# Patient Record
Sex: MALE | Race: WHITE | URBAN - METROPOLITAN AREA
[De-identification: names, ages, dates, MRNs, and addresses within clinical notes are randomized per-mention and may not be internally consistent; named-entity substitution may affect disease eponyms.]

---

## 2019-04-02 ENCOUNTER — APPOINTMENT (RX ONLY)
Dept: URBAN - METROPOLITAN AREA CLINIC 148 | Facility: CLINIC | Age: 59
Setting detail: DERMATOLOGY
End: 2019-04-02

## 2019-04-02 DIAGNOSIS — L81.4 OTHER MELANIN HYPERPIGMENTATION: ICD-10-CM

## 2019-04-02 DIAGNOSIS — I87.2 VENOUS INSUFFICIENCY (CHRONIC) (PERIPHERAL): ICD-10-CM

## 2019-04-02 DIAGNOSIS — L57.0 ACTINIC KERATOSIS: ICD-10-CM

## 2019-04-02 DIAGNOSIS — L82.0 INFLAMED SEBORRHEIC KERATOSIS: ICD-10-CM

## 2019-04-02 DIAGNOSIS — D22 MELANOCYTIC NEVI: ICD-10-CM

## 2019-04-02 DIAGNOSIS — L08.0 PYODERMA: ICD-10-CM

## 2019-04-02 DIAGNOSIS — D18.0 HEMANGIOMA: ICD-10-CM

## 2019-04-02 DIAGNOSIS — L82.1 OTHER SEBORRHEIC KERATOSIS: ICD-10-CM

## 2019-04-02 PROBLEM — D18.01 HEMANGIOMA OF SKIN AND SUBCUTANEOUS TISSUE: Status: ACTIVE | Noted: 2019-04-02

## 2019-04-02 PROBLEM — D22.5 MELANOCYTIC NEVI OF TRUNK: Status: ACTIVE | Noted: 2019-04-02

## 2019-04-02 PROBLEM — I10 ESSENTIAL (PRIMARY) HYPERTENSION: Status: ACTIVE | Noted: 2019-04-02

## 2019-04-02 PROBLEM — D48.5 NEOPLASM OF UNCERTAIN BEHAVIOR OF SKIN: Status: ACTIVE | Noted: 2019-04-02

## 2019-04-02 PROCEDURE — ? LIQUID NITROGEN

## 2019-04-02 PROCEDURE — ? PRESCRIPTION MEDICATION MANAGEMENT

## 2019-04-02 PROCEDURE — 17004 DESTROY PREMAL LESIONS 15/>: CPT

## 2019-04-02 PROCEDURE — 99203 OFFICE O/P NEW LOW 30 MIN: CPT | Mod: 25

## 2019-04-02 PROCEDURE — ? PRESCRIPTION

## 2019-04-02 PROCEDURE — ? COUNSELING

## 2019-04-02 PROCEDURE — ? ADDITIONAL NOTES

## 2019-04-02 PROCEDURE — 17110 DESTRUCTION B9 LES UP TO 14: CPT | Mod: 59

## 2019-04-02 PROCEDURE — 11102 TANGNTL BX SKIN SINGLE LES: CPT | Mod: 59

## 2019-04-02 PROCEDURE — ? BIOPSY BY SHAVE METHOD

## 2019-04-02 PROCEDURE — ? INVENTORY

## 2019-04-02 PROCEDURE — ? ORDER TESTS

## 2019-04-02 RX ORDER — GENTAMICIN SULFATE 1 MG/G
OINTMENT TOPICAL
Qty: 1 | Refills: 0 | Status: ERX

## 2019-04-02 ASSESSMENT — LOCATION SIMPLE DESCRIPTION DERM
LOCATION SIMPLE: RIGHT FOREARM
LOCATION SIMPLE: LEFT PRETIBIAL REGION
LOCATION SIMPLE: LEFT UPPER BACK
LOCATION SIMPLE: POSTERIOR SCALP
LOCATION SIMPLE: RIGHT UPPER BACK
LOCATION SIMPLE: ABDOMEN
LOCATION SIMPLE: GROIN
LOCATION SIMPLE: RIGHT PRETIBIAL REGION
LOCATION SIMPLE: LEFT FOREARM

## 2019-04-02 ASSESSMENT — LOCATION ZONE DERM
LOCATION ZONE: TRUNK
LOCATION ZONE: LEG
LOCATION ZONE: ARM
LOCATION ZONE: SCALP

## 2019-04-02 ASSESSMENT — LOCATION DETAILED DESCRIPTION DERM
LOCATION DETAILED: RIGHT DISTAL RADIAL DORSAL FOREARM
LOCATION DETAILED: LEFT PROXIMAL RADIAL DORSAL FOREARM
LOCATION DETAILED: LEFT MEDIAL UPPER BACK
LOCATION DETAILED: RIGHT PROXIMAL DORSAL FOREARM
LOCATION DETAILED: LEFT DISTAL DORSAL FOREARM
LOCATION DETAILED: LEFT INFERIOR MEDIAL UPPER BACK
LOCATION DETAILED: RIGHT DISTAL PRETIBIAL REGION
LOCATION DETAILED: LEFT SUPERIOR UPPER BACK
LOCATION DETAILED: LEFT OCCIPITAL SCALP
LOCATION DETAILED: LEFT PROXIMAL DORSAL FOREARM
LOCATION DETAILED: PERIUMBILICAL SKIN
LOCATION DETAILED: RIGHT DISTAL ULNAR DORSAL FOREARM
LOCATION DETAILED: RIGHT DISTAL DORSAL FOREARM
LOCATION DETAILED: LEFT DISTAL RADIAL DORSAL FOREARM
LOCATION DETAILED: SUPRAPUBIC SKIN
LOCATION DETAILED: LEFT PROXIMAL PRETIBIAL REGION
LOCATION DETAILED: RIGHT SUPERIOR UPPER BACK

## 2019-04-02 NOTE — PROCEDURE: BIOPSY BY SHAVE METHOD
Biopsy Type: H and E
Billing Type: Third-Party Bill
Type Of Destruction Used: Curettage
Anesthesia Volume In Cc (Will Not Render If 0): 0.5
Electrodesiccation And Curettage Text: The wound bed was treated with electrodesiccation and curettage after the biopsy was performed.
Post-Care Instructions: I reviewed with the patient in detail post-care instructions. Patient is to keep the biopsy site dry overnight, and then apply bacitracin twice daily until healed. Patient may apply hydrogen peroxide soaks to remove any crusting.
Dressing: bandage
Hemostasis: Aluminum Chloride
Silver Nitrate Text: The wound bed was treated with silver nitrate after the biopsy was performed.
Was A Bandage Applied: Yes
Notification Instructions: Patient will be notified of biopsy results. However, patient instructed to call the office if not contacted within 2 weeks.
Biopsy Method: double edge Personna blade
Bill For Surgical Tray: no
Curettage Text: The wound bed was treated with curettage after the biopsy was performed.
Lab: 6
Detail Level: Detailed
Consent: Written consent was obtained and risks were reviewed including but not limited to scarring, infection, bleeding, scabbing, incomplete removal, nerve damage and allergy to anesthesia.
Anesthesia Type: 1% lidocaine with epinephrine
Cryotherapy Text: The wound bed was treated with cryotherapy after the biopsy was performed.
Additional Anesthesia Volume In Cc (Will Not Render If 0): 0
Lab Facility: [id number]
Wound Care: Petrolatum
Electrodesiccation Text: The wound bed was treated with electrodesiccation after the biopsy was performed.
Depth Of Biopsy: dermis

## 2019-04-02 NOTE — PROCEDURE: LIQUID NITROGEN
Post-Care Instructions: I reviewed with the patient in detail post-care instructions. Patient is to wear sunprotection, and avoid picking at any of the treated lesions. Pt may apply Vaseline to crusted or scabbing areas.
Duration Of Freeze Thaw-Cycle (Seconds): 3
Render Post-Care Instructions In Note?: no
Consent: The patient's consent was obtained including but not limited to risks of crusting, scabbing, blistering, scarring, darker or lighter pigmentary change, recurrence, incomplete removal and infection.
Detail Level: Zone
Medical Necessity Clause: This procedure was medically necessary because the lesions that were treated were:
Medical Necessity Information: It is in your best interest to select a reason for this procedure from the list below. All of these items fulfill various CMS LCD requirements except the new and changing color options.
Detail Level: Detailed

## 2019-04-23 ENCOUNTER — APPOINTMENT (RX ONLY)
Dept: URBAN - METROPOLITAN AREA CLINIC 148 | Facility: CLINIC | Age: 59
Setting detail: DERMATOLOGY
End: 2019-04-23

## 2019-04-23 DIAGNOSIS — L81.4 OTHER MELANIN HYPERPIGMENTATION: ICD-10-CM

## 2019-04-23 DIAGNOSIS — L57.0 ACTINIC KERATOSIS: ICD-10-CM

## 2019-04-23 DIAGNOSIS — L82.0 INFLAMED SEBORRHEIC KERATOSIS: ICD-10-CM | Status: IMPROVED

## 2019-04-23 DIAGNOSIS — L91.8 OTHER HYPERTROPHIC DISORDERS OF THE SKIN: ICD-10-CM

## 2019-04-23 DIAGNOSIS — B07.8 OTHER VIRAL WARTS: ICD-10-CM

## 2019-04-23 PROCEDURE — 99213 OFFICE O/P EST LOW 20 MIN: CPT | Mod: 25

## 2019-04-23 PROCEDURE — ? LIQUID NITROGEN

## 2019-04-23 PROCEDURE — ? PATHOLOGY DISCUSSION

## 2019-04-23 PROCEDURE — 17003 DESTRUCT PREMALG LES 2-14: CPT | Mod: 59

## 2019-04-23 PROCEDURE — ? COUNSELING

## 2019-04-23 PROCEDURE — ? BENIGN DESTRUCTION COSMETIC

## 2019-04-23 PROCEDURE — 17000 DESTRUCT PREMALG LESION: CPT | Mod: 59

## 2019-04-23 PROCEDURE — 17110 DESTRUCTION B9 LES UP TO 14: CPT

## 2019-04-23 ASSESSMENT — LOCATION ZONE DERM
LOCATION ZONE: FACE
LOCATION ZONE: LEG
LOCATION ZONE: TRUNK
LOCATION ZONE: NECK
LOCATION ZONE: ARM

## 2019-04-23 ASSESSMENT — LOCATION DETAILED DESCRIPTION DERM
LOCATION DETAILED: LEFT CLAVICULAR NECK
LOCATION DETAILED: RIGHT ANTERIOR DISTAL UPPER ARM
LOCATION DETAILED: RIGHT DISTAL RADIAL DORSAL FOREARM
LOCATION DETAILED: LEFT PROXIMAL PRETIBIAL REGION
LOCATION DETAILED: RIGHT INFERIOR LATERAL NECK
LOCATION DETAILED: RIGHT PROXIMAL DORSAL FOREARM
LOCATION DETAILED: RIGHT CLAVICULAR NECK
LOCATION DETAILED: LEFT PROXIMAL DORSAL FOREARM
LOCATION DETAILED: LEFT MEDIAL SUPERIOR CHEST
LOCATION DETAILED: LEFT SUPERIOR MEDIAL BUCCAL CHEEK
LOCATION DETAILED: LEFT DISTAL DORSAL FOREARM

## 2019-04-23 ASSESSMENT — LOCATION SIMPLE DESCRIPTION DERM
LOCATION SIMPLE: LEFT FOREARM
LOCATION SIMPLE: RIGHT FOREARM
LOCATION SIMPLE: RIGHT UPPER ARM
LOCATION SIMPLE: LEFT ANTERIOR NECK
LOCATION SIMPLE: CHEST
LOCATION SIMPLE: LEFT PRETIBIAL REGION
LOCATION SIMPLE: LEFT CHEEK
LOCATION SIMPLE: RIGHT ANTERIOR NECK

## 2019-04-23 NOTE — PROCEDURE: LIQUID NITROGEN
Detail Level: Detailed
Consent: The patient's consent was obtained including but not limited to risks of crusting, scabbing, blistering, scarring, darker or lighter pigmentary change, recurrence, incomplete removal and infection.
Medical Necessity Information: It is in your best interest to select a reason for this procedure from the list below. All of these items fulfill various CMS LCD requirements except the new and changing color options.
Post-Care Instructions: I reviewed with the patient in detail post-care instructions. Patient is to wear sunprotection, and avoid picking at any of the treated lesions. Pt may apply Vaseline to crusted or scabbing areas.
Render Post-Care Instructions In Note?: no
Medical Necessity Clause: This procedure was medically necessary because the lesions that were treated were:
Duration Of Freeze Thaw-Cycle (Seconds): 3
Detail Level: Zone

## 2019-04-23 NOTE — PROCEDURE: BENIGN DESTRUCTION COSMETIC
Consent: The patient's consent was obtained including but not limited to risks of crusting, scabbing, blistering, scarring, darker or lighter pigmentary change, recurrence, incomplete removal and infection.
Post-Care Instructions: I reviewed with the patient in detail post-care instructions. Patient is to wear sunprotection, and avoid picking at any of the treated lesions. Pt may apply Vaseline to crusted or scabbing areas.
Price (Use Numbers Only, No Special Characters Or $): 150
Detail Level: Detailed
Anesthesia Volume In Cc: 0.5

## 2020-08-07 ENCOUNTER — OFFICE VISIT (OUTPATIENT)
Dept: URBAN - METROPOLITAN AREA CLINIC 7 | Facility: CLINIC | Age: 60
End: 2020-08-07

## 2020-08-20 ENCOUNTER — TELEPHONE ENCOUNTER (OUTPATIENT)
Dept: URBAN - METROPOLITAN AREA CLINIC 9 | Facility: CLINIC | Age: 60
End: 2020-08-20

## 2020-08-21 ENCOUNTER — OFFICE VISIT (OUTPATIENT)
Dept: URBAN - METROPOLITAN AREA SURGERY CENTER 5 | Facility: SURGERY CENTER | Age: 60
End: 2020-08-21

## 2020-08-24 ENCOUNTER — TELEPHONE ENCOUNTER (OUTPATIENT)
Dept: URBAN - METROPOLITAN AREA CLINIC 9 | Facility: CLINIC | Age: 60
End: 2020-08-24

## 2020-08-27 ENCOUNTER — OFFICE VISIT (OUTPATIENT)
Dept: URBAN - METROPOLITAN AREA SURGERY CENTER 5 | Facility: SURGERY CENTER | Age: 60
End: 2020-08-27

## 2020-09-01 ENCOUNTER — TELEPHONE ENCOUNTER (OUTPATIENT)
Dept: URBAN - METROPOLITAN AREA CLINIC 9 | Facility: CLINIC | Age: 60
End: 2020-09-01

## 2021-03-31 ENCOUNTER — OFFICE VISIT (OUTPATIENT)
Dept: URBAN - METROPOLITAN AREA CLINIC 7 | Facility: CLINIC | Age: 61
End: 2021-03-31

## 2021-03-31 ENCOUNTER — TELEPHONE ENCOUNTER (OUTPATIENT)
Dept: URBAN - METROPOLITAN AREA CLINIC 9 | Facility: CLINIC | Age: 61
End: 2021-03-31

## 2021-04-01 ENCOUNTER — OFFICE VISIT (OUTPATIENT)
Age: 61
End: 2021-04-01

## 2021-04-02 ENCOUNTER — TELEPHONE ENCOUNTER (OUTPATIENT)
Dept: URBAN - METROPOLITAN AREA CLINIC 9 | Facility: CLINIC | Age: 61
End: 2021-04-02

## 2021-04-16 ENCOUNTER — OFFICE VISIT (OUTPATIENT)
Dept: URBAN - METROPOLITAN AREA SURGERY CENTER 5 | Facility: SURGERY CENTER | Age: 61
End: 2021-04-16

## 2021-06-03 ENCOUNTER — LAB OUTSIDE AN ENCOUNTER (OUTPATIENT)
Age: 61
End: 2021-06-03

## 2021-06-07 ENCOUNTER — TELEPHONE ENCOUNTER (OUTPATIENT)
Dept: URBAN - METROPOLITAN AREA CLINIC 9 | Facility: CLINIC | Age: 61
End: 2021-06-07

## 2021-06-21 ENCOUNTER — TELEPHONE ENCOUNTER (OUTPATIENT)
Dept: URBAN - METROPOLITAN AREA CLINIC 9 | Facility: CLINIC | Age: 61
End: 2021-06-21

## 2021-06-29 ENCOUNTER — OFFICE VISIT (OUTPATIENT)
Dept: URBAN - METROPOLITAN AREA CLINIC 7 | Facility: CLINIC | Age: 61
End: 2021-06-29

## 2021-07-09 ENCOUNTER — TELEPHONE ENCOUNTER (OUTPATIENT)
Dept: URBAN - METROPOLITAN AREA CLINIC 9 | Facility: CLINIC | Age: 61
End: 2021-07-09

## 2021-07-12 ENCOUNTER — TELEPHONE ENCOUNTER (OUTPATIENT)
Dept: URBAN - METROPOLITAN AREA CLINIC 9 | Facility: CLINIC | Age: 61
End: 2021-07-12

## 2021-07-14 ENCOUNTER — TELEPHONE ENCOUNTER (OUTPATIENT)
Dept: URBAN - METROPOLITAN AREA CLINIC 9 | Facility: CLINIC | Age: 61
End: 2021-07-14

## 2021-08-06 ENCOUNTER — TELEPHONE ENCOUNTER (OUTPATIENT)
Dept: URBAN - METROPOLITAN AREA CLINIC 9 | Facility: CLINIC | Age: 61
End: 2021-08-06

## 2021-08-09 ENCOUNTER — OFFICE VISIT (OUTPATIENT)
Dept: URBAN - METROPOLITAN AREA CLINIC 7 | Facility: CLINIC | Age: 61
End: 2021-08-09

## 2021-08-13 ENCOUNTER — TELEPHONE ENCOUNTER (OUTPATIENT)
Dept: URBAN - METROPOLITAN AREA CLINIC 9 | Facility: CLINIC | Age: 61
End: 2021-08-13

## 2021-08-30 ENCOUNTER — TELEPHONE ENCOUNTER (OUTPATIENT)
Dept: URBAN - METROPOLITAN AREA CLINIC 9 | Facility: CLINIC | Age: 61
End: 2021-08-30

## 2021-09-07 ENCOUNTER — TELEPHONE ENCOUNTER (OUTPATIENT)
Dept: URBAN - METROPOLITAN AREA CLINIC 9 | Facility: CLINIC | Age: 61
End: 2021-09-07

## 2021-09-17 ENCOUNTER — TELEPHONE ENCOUNTER (OUTPATIENT)
Dept: URBAN - METROPOLITAN AREA CLINIC 9 | Facility: CLINIC | Age: 61
End: 2021-09-17

## 2021-10-01 ENCOUNTER — OFFICE VISIT (OUTPATIENT)
Age: 61
End: 2021-10-01

## 2021-10-21 ENCOUNTER — TELEPHONE ENCOUNTER (OUTPATIENT)
Dept: URBAN - METROPOLITAN AREA CLINIC 9 | Facility: CLINIC | Age: 61
End: 2021-10-21

## 2021-10-29 ENCOUNTER — OFFICE VISIT (OUTPATIENT)
Dept: URBAN - METROPOLITAN AREA CLINIC 7 | Facility: CLINIC | Age: 61
End: 2021-10-29

## 2021-11-10 ENCOUNTER — TELEPHONE ENCOUNTER (OUTPATIENT)
Dept: URBAN - METROPOLITAN AREA CLINIC 9 | Facility: CLINIC | Age: 61
End: 2021-11-10

## 2021-11-11 ENCOUNTER — TELEPHONE ENCOUNTER (OUTPATIENT)
Dept: URBAN - METROPOLITAN AREA CLINIC 9 | Facility: CLINIC | Age: 61
End: 2021-11-11

## 2021-11-30 ENCOUNTER — LAB OUTSIDE AN ENCOUNTER (OUTPATIENT)
Age: 61
End: 2021-11-30

## 2021-12-10 ENCOUNTER — OFFICE VISIT (OUTPATIENT)
Dept: URBAN - METROPOLITAN AREA CLINIC 7 | Facility: CLINIC | Age: 61
End: 2021-12-10

## 2021-12-14 ENCOUNTER — OFFICE VISIT (OUTPATIENT)
Dept: URBAN - METROPOLITAN AREA CLINIC 7 | Facility: CLINIC | Age: 61
End: 2021-12-14

## 2021-12-16 ENCOUNTER — TELEPHONE ENCOUNTER (OUTPATIENT)
Dept: URBAN - METROPOLITAN AREA CLINIC 9 | Facility: CLINIC | Age: 61
End: 2021-12-16

## 2021-12-28 ENCOUNTER — TELEPHONE ENCOUNTER (OUTPATIENT)
Dept: URBAN - METROPOLITAN AREA CLINIC 9 | Facility: CLINIC | Age: 61
End: 2021-12-28

## 2021-12-30 ENCOUNTER — TELEPHONE ENCOUNTER (OUTPATIENT)
Dept: URBAN - METROPOLITAN AREA CLINIC 9 | Facility: CLINIC | Age: 61
End: 2021-12-30

## 2021-12-30 ENCOUNTER — LAB OUTSIDE AN ENCOUNTER (OUTPATIENT)
Age: 61
End: 2021-12-30

## 2022-01-05 ENCOUNTER — LAB OUTSIDE AN ENCOUNTER (OUTPATIENT)
Age: 62
End: 2022-01-05

## 2022-01-18 ENCOUNTER — TELEPHONE ENCOUNTER (OUTPATIENT)
Dept: URBAN - METROPOLITAN AREA CLINIC 9 | Facility: CLINIC | Age: 62
End: 2022-01-18

## 2022-02-03 ENCOUNTER — LAB OUTSIDE AN ENCOUNTER (OUTPATIENT)
Age: 62
End: 2022-02-03

## 2022-02-15 ENCOUNTER — TELEPHONE ENCOUNTER (OUTPATIENT)
Dept: URBAN - METROPOLITAN AREA CLINIC 9 | Facility: CLINIC | Age: 62
End: 2022-02-15

## 2022-04-28 ENCOUNTER — TELEPHONE ENCOUNTER (OUTPATIENT)
Dept: URBAN - METROPOLITAN AREA CLINIC 9 | Facility: CLINIC | Age: 62
End: 2022-04-28

## 2022-05-24 ENCOUNTER — OFFICE VISIT (OUTPATIENT)
Dept: URBAN - METROPOLITAN AREA CLINIC 7 | Facility: CLINIC | Age: 62
End: 2022-05-24

## 2022-05-31 ENCOUNTER — LAB OUTSIDE AN ENCOUNTER (OUTPATIENT)
Age: 62
End: 2022-05-31

## 2022-06-14 ENCOUNTER — TELEPHONE ENCOUNTER (OUTPATIENT)
Dept: URBAN - METROPOLITAN AREA CLINIC 9 | Facility: CLINIC | Age: 62
End: 2022-06-14

## 2022-06-23 ENCOUNTER — TELEPHONE ENCOUNTER (OUTPATIENT)
Dept: URBAN - METROPOLITAN AREA CLINIC 9 | Facility: CLINIC | Age: 62
End: 2022-06-23

## 2022-07-12 ENCOUNTER — TELEPHONE ENCOUNTER (OUTPATIENT)
Dept: URBAN - METROPOLITAN AREA CLINIC 9 | Facility: CLINIC | Age: 62
End: 2022-07-12

## 2022-07-21 ENCOUNTER — TELEPHONE ENCOUNTER (OUTPATIENT)
Dept: URBAN - METROPOLITAN AREA CLINIC 9 | Facility: CLINIC | Age: 62
End: 2022-07-21

## 2022-07-26 ENCOUNTER — TELEPHONE ENCOUNTER (OUTPATIENT)
Dept: URBAN - METROPOLITAN AREA CLINIC 9 | Facility: CLINIC | Age: 62
End: 2022-07-26

## 2022-07-30 ENCOUNTER — TELEPHONE ENCOUNTER (OUTPATIENT)
Age: 62
End: 2022-07-30

## 2022-07-30 RX ORDER — LACTULOSE 10 G/15ML
30 SOLUTION ORAL DAILY
Qty: 0 | Refills: 16 | OUTPATIENT
Start: 2021-07-12 | End: 2021-10-29

## 2022-07-30 RX ORDER — HYDROXYZINE HYDROCHLORIDE 10 MG/1
1 (ONE) TABLET ORAL AT BEDTIME
Qty: 0 | Refills: 3 | OUTPATIENT
Start: 2021-07-12 | End: 2021-08-09

## 2022-07-30 RX ORDER — COLESTIPOL HYDROCHLORIDE 1 G/1
1 (ONE) TABLET, FILM COATED ORAL
Qty: 0 | Refills: 5 | OUTPATIENT
Start: 2020-08-07 | End: 2021-10-29

## 2022-07-30 RX ORDER — SPIRONOLACTONE 50 MG/1
1 (ONE) TABLET, FILM COATED ORAL DAILY
Qty: 0 | Refills: 5 | OUTPATIENT
Start: 2021-03-31 | End: 2021-07-06

## 2022-07-31 ENCOUNTER — TELEPHONE ENCOUNTER (OUTPATIENT)
Age: 62
End: 2022-07-31

## 2022-07-31 RX ORDER — HYDROXYZINE HYDROCHLORIDE 10 MG/1
1 (ONE) TABLET ORAL AT BEDTIME
Qty: 0 | Refills: 3 | Status: ACTIVE | COMMUNITY
Start: 2021-07-12

## 2022-07-31 RX ORDER — SPIRONOLACTONE 50 MG/1
2 (TWO) TABLET, FILM COATED ORAL DAILY
Qty: 0 | Refills: 5 | Status: ACTIVE | COMMUNITY
Start: 2022-05-24

## 2022-07-31 RX ORDER — HYDROXYZINE HYDROCHLORIDE 10 MG/1
1 (ONE) TABLET ORAL AT BEDTIME
Qty: 0 | Refills: 3 | Status: ACTIVE | COMMUNITY
Start: 2021-07-09

## 2022-07-31 RX ORDER — LACTULOSE 10 G/15ML
30 SOLUTION ORAL DAILY
Qty: 0 | Refills: 16 | Status: ACTIVE | COMMUNITY
Start: 2021-07-09

## 2022-07-31 RX ORDER — COLESTIPOL HYDROCHLORIDE 1 G/1
1 (ONE) TABLET, FILM COATED ORAL
Qty: 0 | Refills: 5 | Status: ACTIVE | COMMUNITY
Start: 2020-08-07

## 2022-07-31 RX ORDER — SPIRONOLACTONE 50 MG/1
1 (ONE) TABLET, FILM COATED ORAL DAILY
Qty: 0 | Refills: 5 | Status: ACTIVE | COMMUNITY
Start: 2021-03-31

## 2022-07-31 RX ORDER — LACTULOSE 10 G/15ML
30 SOLUTION ORAL DAILY
Qty: 0 | Refills: 16 | Status: ACTIVE | COMMUNITY
Start: 2021-07-12

## 2022-07-31 RX ORDER — SPIRONOLACTONE 25 MG/1
TABLET ORAL DAILY
Qty: 0 | Refills: 5 | Status: ACTIVE | COMMUNITY

## 2022-08-07 ENCOUNTER — WEB ENCOUNTER (OUTPATIENT)
Dept: URBAN - METROPOLITAN AREA CLINIC 7 | Facility: CLINIC | Age: 62
End: 2022-08-07

## 2022-08-11 ENCOUNTER — TELEPHONE ENCOUNTER (OUTPATIENT)
Dept: URBAN - METROPOLITAN AREA CLINIC 7 | Facility: CLINIC | Age: 62
End: 2022-08-11

## 2022-08-15 ENCOUNTER — TELEPHONE ENCOUNTER (OUTPATIENT)
Dept: URBAN - METROPOLITAN AREA CLINIC 7 | Facility: CLINIC | Age: 62
End: 2022-08-15

## 2022-08-15 ENCOUNTER — OFFICE VISIT (OUTPATIENT)
Dept: URBAN - METROPOLITAN AREA CLINIC 7 | Facility: CLINIC | Age: 62
End: 2022-08-15

## 2022-08-26 ENCOUNTER — WEB ENCOUNTER (OUTPATIENT)
Dept: URBAN - METROPOLITAN AREA CLINIC 7 | Facility: CLINIC | Age: 62
End: 2022-08-26

## 2022-08-26 ENCOUNTER — TELEPHONE ENCOUNTER (OUTPATIENT)
Dept: URBAN - METROPOLITAN AREA CLINIC 7 | Facility: CLINIC | Age: 62
End: 2022-08-26

## 2022-08-27 ENCOUNTER — LAB OUTSIDE AN ENCOUNTER (OUTPATIENT)
Dept: URBAN - METROPOLITAN AREA CLINIC 7 | Facility: CLINIC | Age: 62
End: 2022-08-27

## 2022-08-29 ENCOUNTER — TELEPHONE ENCOUNTER (OUTPATIENT)
Dept: URBAN - METROPOLITAN AREA CLINIC 7 | Facility: CLINIC | Age: 62
End: 2022-08-29

## 2022-08-29 ENCOUNTER — WEB ENCOUNTER (OUTPATIENT)
Dept: URBAN - METROPOLITAN AREA CLINIC 7 | Facility: CLINIC | Age: 62
End: 2022-08-29

## 2022-09-01 ENCOUNTER — TELEPHONE ENCOUNTER (OUTPATIENT)
Dept: URBAN - METROPOLITAN AREA CLINIC 7 | Facility: CLINIC | Age: 62
End: 2022-09-01

## 2022-09-06 ENCOUNTER — LAB OUTSIDE AN ENCOUNTER (OUTPATIENT)
Dept: URBAN - METROPOLITAN AREA CLINIC 7 | Facility: CLINIC | Age: 62
End: 2022-09-06

## 2022-09-06 ENCOUNTER — TELEPHONE ENCOUNTER (OUTPATIENT)
Dept: URBAN - METROPOLITAN AREA CLINIC 7 | Facility: CLINIC | Age: 62
End: 2022-09-06

## 2022-09-07 ENCOUNTER — LAB OUTSIDE AN ENCOUNTER (OUTPATIENT)
Dept: URBAN - METROPOLITAN AREA CLINIC 7 | Facility: CLINIC | Age: 62
End: 2022-09-07

## 2022-09-07 ENCOUNTER — CLAIMS CREATED FROM THE CLAIM WINDOW (OUTPATIENT)
Dept: URBAN - METROPOLITAN AREA CLINIC 7 | Facility: CLINIC | Age: 62
End: 2022-09-07
Payer: OTHER GOVERNMENT

## 2022-09-07 ENCOUNTER — TELEPHONE ENCOUNTER (OUTPATIENT)
Dept: URBAN - METROPOLITAN AREA CLINIC 7 | Facility: CLINIC | Age: 62
End: 2022-09-07

## 2022-09-07 ENCOUNTER — WEB ENCOUNTER (OUTPATIENT)
Dept: URBAN - METROPOLITAN AREA CLINIC 7 | Facility: CLINIC | Age: 62
End: 2022-09-07

## 2022-09-07 VITALS
TEMPERATURE: 97.6 F | BODY MASS INDEX: 41.75 KG/M2 | DIASTOLIC BLOOD PRESSURE: 80 MMHG | WEIGHT: 315 LBS | RESPIRATION RATE: 18 BRPM | SYSTOLIC BLOOD PRESSURE: 138 MMHG | HEIGHT: 73 IN

## 2022-09-07 DIAGNOSIS — E87.70 FLUID OVERLOAD: ICD-10-CM

## 2022-09-07 DIAGNOSIS — E11.9 DIABETES MELLITUS: ICD-10-CM

## 2022-09-07 DIAGNOSIS — Z95.828 S/P TIPS (TRANSJUGULAR INTRAHEPATIC PORTOSYSTEMIC SHUNT): ICD-10-CM

## 2022-09-07 DIAGNOSIS — Z86.010 HISTORY OF COLON POLYPS: ICD-10-CM

## 2022-09-07 DIAGNOSIS — R18.8 ASCITES: ICD-10-CM

## 2022-09-07 DIAGNOSIS — K70.31 ALCOHOLIC CIRRHOSIS OF LIVER WITH ASCITES: ICD-10-CM

## 2022-09-07 PROCEDURE — 99214 OFFICE O/P EST MOD 30 MIN: CPT | Performed by: INTERNAL MEDICINE

## 2022-09-07 RX ORDER — LACTULOSE 10 G/15ML
30 SOLUTION ORAL DAILY
Qty: 0 | Refills: 16 | Status: DISCONTINUED | COMMUNITY
Start: 2021-07-09

## 2022-09-07 RX ORDER — HYDROXYZINE HYDROCHLORIDE 10 MG/1
1 (ONE) TABLET ORAL AT BEDTIME
Qty: 0 | Refills: 3 | Status: DISCONTINUED | COMMUNITY
Start: 2021-07-09

## 2022-09-07 RX ORDER — FERROUS SULFATE 325(65) MG
1 TABLET TABLET ORAL ONCE A DAY
Status: ACTIVE | COMMUNITY

## 2022-09-07 RX ORDER — SPIRONOLACTONE 50 MG/1
1 (ONE) TABLET, FILM COATED ORAL DAILY
Qty: 0 | Refills: 5 | Status: DISCONTINUED | COMMUNITY
Start: 2021-03-31

## 2022-09-07 RX ORDER — TORSEMIDE 20 MG/1
AS DIRECTED TABLET ORAL
Status: ACTIVE | COMMUNITY

## 2022-09-07 RX ORDER — CALCIUM CARBONATE 300MG(750)
AS DIRECTED TABLET,CHEWABLE ORAL
Status: ACTIVE | COMMUNITY

## 2022-09-07 RX ORDER — SPIRONOLACTONE 25 MG/1
TABLET ORAL DAILY
Qty: 0 | Refills: 5 | Status: ACTIVE | COMMUNITY

## 2022-09-07 RX ORDER — COLESTIPOL HYDROCHLORIDE 1 G/1
1 (ONE) TABLET, FILM COATED ORAL
Qty: 0 | Refills: 5 | Status: DISCONTINUED | COMMUNITY
Start: 2020-08-07

## 2022-09-07 RX ORDER — ASPIRIN 81 MG/1
1 TABLET TABLET, COATED ORAL ONCE A DAY
Status: ACTIVE | COMMUNITY

## 2022-09-07 RX ORDER — PANTOPRAZOLE SODIUM 40 MG/1
1 TABLET TABLET, DELAYED RELEASE ORAL TWICE A DAY
Status: ACTIVE | COMMUNITY

## 2022-09-07 RX ORDER — NADOLOL 20 MG/1
2 TABLETS TABLET ORAL ONCE A DAY
Status: ACTIVE | COMMUNITY

## 2022-09-07 NOTE — HPI-TODAY'S VISIT:
LV 5/2022. Following him for decompensated EtOH cirrhosis with recurrent large volume ascites treated with diuretics and historically complicated by mild hyponatremia. He finally had TIPS 4/25/22, but unforunately despite this, has developed recurrent ascites. EGD 4/16/21 with variable Z-line (no serra's), no EV, small HH, mild PHG. Colon 8/2020 with 3 TA, normal colon bx, diverticulosis. CT liver in 7/2021 was negative for hepatic masses and no evidence of PVT. In 2020, neg AMA, ASMA, normal iron, negative celiac testing. Has been seen at liver txp center at this point (at [facility]). AFP 3.9. ECHO in 2020 with normal LVEF and RVSP. Prior to TIPS, had been getting weekly paracenteses for months (usually large volume, >6 liters). Urine sodium was low. Labs 11/9/21 with Hgb 13.4, plts 107, Cr 1.0, Na 137. Aside from ascites, he has not had any other signs of decompensation. No GI bleeding, no jaundice. Unfortunately, his TIPS was complicated by volume overload necessitating diuretic use (heart failure symptoms). Recent labs prior to LV with normal Cr, , MELD-Na 13. Ammonia in the 50s. Prior to last visit, had 3 IV diuresis treatments since his TIPS was placed due to voluem overload. Was still having some shortness of breath, dyspnea on exertion, conversationally dyspneic. The shortness of breath is frustrating to him. Was taking torsemide 40 mg BID, and I added aldactone 100 mg daily. Due for MRI liver 11/2022 and due for variceal surveillance now. Recent labs in late Aug 2022 with Hgb 9.7, plts 116 (Hgb was in the 12s earlier this year), INR 1.21, Na 138, Cr 1.85, . AFP 2.5 in July 2022. BNP > 1000 in July. Para in late 8/2022 with low protein, low albumin, low PMN count, cytology neg, culture neg. Is back to having LVP once weekly. ECHO 8/2022 with EF 55-60%, low normal RV function, TAVR with good function, progressive MV stenosis. Cards wants dobutamine stress done. TIPS was downsized in July from 10 mm to 6 mm and there was reduction in atrial pressure (40 to 33 mm Hg). Remains on torsemide and aldactone. MELD 15 based on recent labs. HVPG of 8 after downsizing, as portal pressure and RA pressure were essentially equal prior to shunt downsize. He continues to have edema, ascites, and having issues with breathing. Was told he needs repair of his mitral valve. Torsemide 60 mg BID, 100 mg aldactone daily. Continues with shortness of breath. Ultrafiltration was recommended by cards, and he did see nephrology. He does have LE edema, and ascites.

## 2022-09-09 ENCOUNTER — LAB OUTSIDE AN ENCOUNTER (OUTPATIENT)
Dept: URBAN - METROPOLITAN AREA CLINIC 7 | Facility: CLINIC | Age: 62
End: 2022-09-09

## 2022-09-10 ENCOUNTER — LAB OUTSIDE AN ENCOUNTER (OUTPATIENT)
Dept: URBAN - METROPOLITAN AREA CLINIC 7 | Facility: CLINIC | Age: 62
End: 2022-09-10

## 2022-09-11 ENCOUNTER — LAB OUTSIDE AN ENCOUNTER (OUTPATIENT)
Dept: URBAN - METROPOLITAN AREA CLINIC 7 | Facility: CLINIC | Age: 62
End: 2022-09-11

## 2022-09-12 ENCOUNTER — LAB OUTSIDE AN ENCOUNTER (OUTPATIENT)
Dept: URBAN - METROPOLITAN AREA CLINIC 7 | Facility: CLINIC | Age: 62
End: 2022-09-12

## 2022-09-13 ENCOUNTER — LAB OUTSIDE AN ENCOUNTER (OUTPATIENT)
Dept: URBAN - METROPOLITAN AREA CLINIC 7 | Facility: CLINIC | Age: 62
End: 2022-09-13

## 2022-09-15 ENCOUNTER — LAB OUTSIDE AN ENCOUNTER (OUTPATIENT)
Dept: URBAN - METROPOLITAN AREA CLINIC 7 | Facility: CLINIC | Age: 62
End: 2022-09-15

## 2022-09-15 ENCOUNTER — TELEPHONE ENCOUNTER (OUTPATIENT)
Dept: URBAN - METROPOLITAN AREA CLINIC 7 | Facility: CLINIC | Age: 62
End: 2022-09-15

## 2022-09-16 ENCOUNTER — LAB OUTSIDE AN ENCOUNTER (OUTPATIENT)
Dept: URBAN - METROPOLITAN AREA CLINIC 7 | Facility: CLINIC | Age: 62
End: 2022-09-16

## 2022-09-17 ENCOUNTER — LAB OUTSIDE AN ENCOUNTER (OUTPATIENT)
Dept: URBAN - METROPOLITAN AREA CLINIC 7 | Facility: CLINIC | Age: 62
End: 2022-09-17

## 2022-09-19 ENCOUNTER — LAB OUTSIDE AN ENCOUNTER (OUTPATIENT)
Dept: URBAN - METROPOLITAN AREA CLINIC 7 | Facility: CLINIC | Age: 62
End: 2022-09-19

## 2022-09-21 ENCOUNTER — LAB OUTSIDE AN ENCOUNTER (OUTPATIENT)
Dept: URBAN - METROPOLITAN AREA CLINIC 7 | Facility: CLINIC | Age: 62
End: 2022-09-21

## 2022-09-22 ENCOUNTER — WEB ENCOUNTER (OUTPATIENT)
Dept: URBAN - METROPOLITAN AREA CLINIC 7 | Facility: CLINIC | Age: 62
End: 2022-09-22

## 2022-09-22 ENCOUNTER — LAB OUTSIDE AN ENCOUNTER (OUTPATIENT)
Dept: URBAN - METROPOLITAN AREA CLINIC 7 | Facility: CLINIC | Age: 62
End: 2022-09-22

## 2022-09-23 ENCOUNTER — LAB OUTSIDE AN ENCOUNTER (OUTPATIENT)
Dept: URBAN - METROPOLITAN AREA CLINIC 7 | Facility: CLINIC | Age: 62
End: 2022-09-23

## 2022-09-23 ENCOUNTER — TELEPHONE ENCOUNTER (OUTPATIENT)
Dept: URBAN - METROPOLITAN AREA CLINIC 7 | Facility: CLINIC | Age: 62
End: 2022-09-23

## 2022-09-25 ENCOUNTER — LAB OUTSIDE AN ENCOUNTER (OUTPATIENT)
Dept: URBAN - METROPOLITAN AREA CLINIC 7 | Facility: CLINIC | Age: 62
End: 2022-09-25

## 2022-09-26 ENCOUNTER — TELEPHONE ENCOUNTER (OUTPATIENT)
Dept: URBAN - METROPOLITAN AREA CLINIC 7 | Facility: CLINIC | Age: 62
End: 2022-09-26

## 2022-09-26 ENCOUNTER — CLAIMS CREATED FROM THE CLAIM WINDOW (OUTPATIENT)
Dept: URBAN - METROPOLITAN AREA CLINIC 7 | Facility: CLINIC | Age: 62
End: 2022-09-26
Payer: OTHER GOVERNMENT

## 2022-09-26 ENCOUNTER — LAB OUTSIDE AN ENCOUNTER (OUTPATIENT)
Dept: URBAN - METROPOLITAN AREA CLINIC 7 | Facility: CLINIC | Age: 62
End: 2022-09-26

## 2022-09-26 ENCOUNTER — OFFICE VISIT (OUTPATIENT)
Dept: URBAN - METROPOLITAN AREA CLINIC 7 | Facility: CLINIC | Age: 62
End: 2022-09-26

## 2022-09-26 VITALS
SYSTOLIC BLOOD PRESSURE: 122 MMHG | TEMPERATURE: 97.7 F | HEIGHT: 73 IN | BODY MASS INDEX: 39.89 KG/M2 | DIASTOLIC BLOOD PRESSURE: 76 MMHG | WEIGHT: 301 LBS

## 2022-09-26 DIAGNOSIS — E87.70 FLUID OVERLOAD: ICD-10-CM

## 2022-09-26 DIAGNOSIS — Z86.010 HISTORY OF COLON POLYPS: ICD-10-CM

## 2022-09-26 DIAGNOSIS — K70.31 ALCOHOLIC CIRRHOSIS OF LIVER WITH ASCITES: ICD-10-CM

## 2022-09-26 DIAGNOSIS — E11.9 DIABETES MELLITUS: ICD-10-CM

## 2022-09-26 DIAGNOSIS — Z95.828 S/P TIPS (TRANSJUGULAR INTRAHEPATIC PORTOSYSTEMIC SHUNT): ICD-10-CM

## 2022-09-26 DIAGNOSIS — R18.8 REFRACTORY ASCITES: ICD-10-CM

## 2022-09-26 DIAGNOSIS — R18.8 ASCITES: ICD-10-CM

## 2022-09-26 PROCEDURE — 99215 OFFICE O/P EST HI 40 MIN: CPT | Performed by: INTERNAL MEDICINE

## 2022-09-26 RX ORDER — NADOLOL 20 MG/1
2 TABLETS TABLET ORAL ONCE A DAY
Status: ACTIVE | COMMUNITY

## 2022-09-26 RX ORDER — FERROUS SULFATE 325(65) MG
1 TABLET TABLET ORAL ONCE A DAY
Status: ACTIVE | COMMUNITY

## 2022-09-26 RX ORDER — ASPIRIN 81 MG/1
1 TABLET TABLET, COATED ORAL ONCE A DAY
Status: ACTIVE | COMMUNITY

## 2022-09-26 RX ORDER — SPIRONOLACTONE 25 MG/1
TABLET ORAL DAILY
Qty: 0 | Refills: 5 | Status: ACTIVE | COMMUNITY

## 2022-09-26 RX ORDER — PANTOPRAZOLE SODIUM 40 MG/1
1 TABLET TABLET, DELAYED RELEASE ORAL TWICE A DAY
Status: ACTIVE | COMMUNITY

## 2022-09-26 RX ORDER — TORSEMIDE 20 MG/1
AS DIRECTED TABLET ORAL
Status: ACTIVE | COMMUNITY

## 2022-09-26 RX ORDER — CALCIUM CARBONATE 300MG(750)
AS DIRECTED TABLET,CHEWABLE ORAL
Status: ACTIVE | COMMUNITY

## 2022-09-26 NOTE — HPI-TODAY'S VISIT:
LV 5/2022. Following him for decompensated EtOH cirrhosis with recurrent large volume ascites treated with diuretics and historically complicated by mild hyponatremia. He finally had TIPS 4/25/22, but unforunately despite this, has developed recurrent ascites. EGD 4/16/21 with variable Z-line (no serra's), no EV, small HH, mild PHG. Colon 8/2020 with 3 TA, normal colon bx, diverticulosis. CT liver in 7/2021 was negative for hepatic masses and no evidence of PVT. In 2020, neg AMA, ASMA, normal iron, negative celiac testing. Has been seen at liver txp center at this point (at [facility]). AFP 3.9. ECHO in 2020 with normal LVEF and RVSP. Prior to TIPS, had been getting weekly paracenteses for months (usually large volume, >6 liters). Urine sodium was low. Labs 11/9/21 with Hgb 13.4, plts 107, Cr 1.0, Na 137. Aside from ascites, he has not had any other signs of decompensation. No GI bleeding, no jaundice. Unfortunately, his TIPS was complicated by volume overload necessitating diuretic use (heart failure symptoms). Recent labs prior to LV with normal Cr, , MELD-Na 13. Ammonia in the 50s. Prior to last visit, had 3 IV diuresis treatments since his TIPS was placed due to voluem overload. Was still having some shortness of breath, dyspnea on exertion, conversationally dyspneic. The shortness of breath is frustrating to him. Was taking torsemide 40 mg BID, and I added aldactone 100 mg daily. Due for MRI liver 11/2022 and due for variceal surveillance now. Recent labs in late Aug 2022 with Hgb 9.7, plts 116 (Hgb was in the 12s earlier this year), INR 1.21, Na 138, Cr 1.85, . AFP 2.5 in July 2022. BNP > 1000 in July. Para in late 8/2022 with low protein, low albumin, low PMN count, cytology neg, culture neg. Is back to having LVP once weekly. ECHO 8/2022 with EF 55-60%, low normal RV function, TAVR with good function, progressive MV stenosis. Cards wants dobutamine stress done. TIPS was downsized in July from 10 mm to 6 mm and there was reduction in atrial pressure (40 to 33 mm Hg). Remains on torsemide and aldactone. MELD 15 based on recent labs. HVPG of 8 after downsizing, as portal pressure and RA pressure were essentially equal prior to shunt downsize. He continues to have edema, ascites, and having issues with breathing. Was told he needs repair of his mitral valve. Torsemide 60 mg BID, 100 mg aldactone daily. Continues with shortness of breath. Ultrafiltration was recommended by cards, and he did see nephrology. He does have LE edema, and ascites. Advise that he be hospitalized for aggressive diuresis which he was since his last visit.  He had a dobutamine stress echo done in September 21, 2022 which demonstrated an LVEF of 55 to 60%, normal right ventricular and left ventricular systolic function, and progressive moderate mitral valve stenosis with a mean gradient of 9 mmHg.  He did have severe mitral valve stenosis post-rest with a mean gradient of 14.  Patient did have a right heart cath on September 16, 2022 which demonstrated severe pulmonary hypertension (64/24 with a mean of 43) with positive vasoactive adenosine challenge and pulmonary pressures that decreased to a mean of 33.  His TIPS closure was on September 14.  Labs in the hospital in mid September demonstrated a white count of 3.6, hemoglobin 9.5, platelets of 105, and INR 1.2.  Basic metabolic panel demonstrated a sodium of 133 and a creatinine of 1.08.  He had multiple paracenteses in the hospital.  He was getting IV lasix in the hospital. Now on aldactone 100 mg daily, torsemide restarted at 60 mg BID. Getting paracenteses twice daily with albumin.

## 2022-09-26 NOTE — HPI-TODAY'S VISIT:
LV 5/2022. Following him for decompensated EtOH cirrhosis with recurrent large volume ascites treated with diuretics and historically complicated by mild hyponatremia. He finally had TIPS 4/25/22, but unforunately despite this, has developed recurrent ascites. EGD 4/16/21 with variable Z-line (no serra's), no EV, small HH, mild PHG. Colon 8/2020 with 3 TA, normal colon bx, diverticulosis. CT liver in 7/2021 was negative for hepatic masses and no evidence of PVT. In 2020, neg AMA, ASMA, normal iron, negative celiac testing. Has been seen at liver txp center at this point (at [facility]). AFP 3.9. ECHO in 2020 with normal LVEF and RVSP. Prior to TIPS, had been getting weekly paracenteses for months (usually large volume, >6 liters). Urine sodium was low. Labs 11/9/21 with Hgb 13.4, plts 107, Cr 1.0, Na 137. Aside from ascites, he has not had any other signs of decompensation. No GI bleeding, no jaundice. Unfortunately, his TIPS was complicated by volume overload necessitating diuretic use (heart failure symptoms). Recent labs prior to LV with normal Cr, , MELD-Na 13. Ammonia in the 50s. Prior to last visit, had 3 IV diuresis treatments since his TIPS was placed due to voluem overload. Was still having some shortness of breath, dyspnea on exertion, conversationally dyspneic. The shortness of breath is frustrating to him. Was taking torsemide 40 mg BID, and I added aldactone 100 mg daily. Due for MRI liver 11/2022 and due for variceal surveillance now. Recent labs in late Aug 2022 with Hgb 9.7, plts 116 (Hgb was in the 12s earlier this year), INR 1.21, Na 138, Cr 1.85, . AFP 2.5 in July 2022. BNP > 1000 in July. Para in late 8/2022 with low protein, low albumin, low PMN count, cytology neg, culture neg. Is back to having LVP once weekly. ECHO 8/2022 with EF 55-60%, low normal RV function, TAVR with good function, progressive MV stenosis. Cards wants dobutamine stress done. TIPS was downsized in July from 10 mm to 6 mm and there was reduction in atrial pressure (40 to 33 mm Hg). Remains on torsemide and aldactone. MELD 15 based on recent labs. HVPG of 8 after downsizing, as portal pressure and RA pressure were essentially equal prior to shunt downsize. He continues to have edema, ascites, and having issues with breathing. Was told he needs repair of his mitral valve. Torsemide 60 mg BID, 100 mg aldactone daily. Continues with shortness of breath. Ultrafiltration was recommended by cards, and he did see nephrology. He does have LE edema, and ascites.  I advised that he be hospitalized for aggressive diuresis, which he was, and cardiac testing, and TIPS reversal. Dobutamine stress echo done 9/21/2022 demonstrated a left ventricular EF of 55 to 60%, normal right ventricular and left ventricular systolic function, progressive moderate mitral valve stenosis with a mean gradient of 9 mmHg.  Left ventricular function is estimated to be hyperdynamic with an ejection fraction of over 65% after stress.  Severe mitral valve stenosis post-rest with a mean gradient of 14 mmHg.  Patient did have a right heart cath on September 16, 2022 which demonstrated severe pulmonary hypertension (64/24 with a mean of 43) with positive vasoactive adenosine challenge with a pulmonary pressure that decreased to 52/13 with a mean of 33.  He also had a TIPS closure on September 14. Labs on September 14 demonstrated a white count of 3.6, hemoglobin 9.5, platelets of 107, INR 1.2.  Basic panel on 916 demonstrated a sodium of 133, creatinine 1.08, potassium 4.1.  He did have a bubble study done in the hospital which demonstrated no evidence for shunt. He had multiple para's in the hospital.

## 2022-09-27 ENCOUNTER — LAB OUTSIDE AN ENCOUNTER (OUTPATIENT)
Dept: URBAN - METROPOLITAN AREA CLINIC 7 | Facility: CLINIC | Age: 62
End: 2022-09-27

## 2022-09-28 ENCOUNTER — LAB OUTSIDE AN ENCOUNTER (OUTPATIENT)
Dept: URBAN - METROPOLITAN AREA CLINIC 7 | Facility: CLINIC | Age: 62
End: 2022-09-28

## 2022-09-29 ENCOUNTER — LAB OUTSIDE AN ENCOUNTER (OUTPATIENT)
Dept: URBAN - METROPOLITAN AREA CLINIC 7 | Facility: CLINIC | Age: 62
End: 2022-09-29

## 2022-10-01 ENCOUNTER — LAB OUTSIDE AN ENCOUNTER (OUTPATIENT)
Dept: URBAN - METROPOLITAN AREA CLINIC 7 | Facility: CLINIC | Age: 62
End: 2022-10-01

## 2022-10-03 ENCOUNTER — LAB OUTSIDE AN ENCOUNTER (OUTPATIENT)
Dept: URBAN - METROPOLITAN AREA CLINIC 7 | Facility: CLINIC | Age: 62
End: 2022-10-03

## 2022-10-04 ENCOUNTER — LAB OUTSIDE AN ENCOUNTER (OUTPATIENT)
Dept: URBAN - METROPOLITAN AREA CLINIC 7 | Facility: CLINIC | Age: 62
End: 2022-10-04

## 2022-10-05 ENCOUNTER — LAB OUTSIDE AN ENCOUNTER (OUTPATIENT)
Dept: URBAN - METROPOLITAN AREA CLINIC 7 | Facility: CLINIC | Age: 62
End: 2022-10-05

## 2022-10-06 ENCOUNTER — TELEPHONE ENCOUNTER (OUTPATIENT)
Dept: URBAN - METROPOLITAN AREA CLINIC 9 | Facility: CLINIC | Age: 62
End: 2022-10-06

## 2022-10-07 ENCOUNTER — LAB OUTSIDE AN ENCOUNTER (OUTPATIENT)
Dept: URBAN - METROPOLITAN AREA CLINIC 7 | Facility: CLINIC | Age: 62
End: 2022-10-07

## 2022-10-09 ENCOUNTER — LAB OUTSIDE AN ENCOUNTER (OUTPATIENT)
Dept: URBAN - METROPOLITAN AREA CLINIC 7 | Facility: CLINIC | Age: 62
End: 2022-10-09

## 2022-10-10 ENCOUNTER — LAB OUTSIDE AN ENCOUNTER (OUTPATIENT)
Dept: URBAN - METROPOLITAN AREA CLINIC 7 | Facility: CLINIC | Age: 62
End: 2022-10-10

## 2022-10-11 ENCOUNTER — LAB OUTSIDE AN ENCOUNTER (OUTPATIENT)
Dept: URBAN - METROPOLITAN AREA CLINIC 7 | Facility: CLINIC | Age: 62
End: 2022-10-11

## 2022-10-13 ENCOUNTER — LAB OUTSIDE AN ENCOUNTER (OUTPATIENT)
Dept: URBAN - METROPOLITAN AREA CLINIC 7 | Facility: CLINIC | Age: 62
End: 2022-10-13

## 2022-10-13 ENCOUNTER — TELEPHONE ENCOUNTER (OUTPATIENT)
Dept: URBAN - METROPOLITAN AREA CLINIC 7 | Facility: CLINIC | Age: 62
End: 2022-10-13

## 2022-10-15 ENCOUNTER — LAB OUTSIDE AN ENCOUNTER (OUTPATIENT)
Dept: URBAN - METROPOLITAN AREA CLINIC 7 | Facility: CLINIC | Age: 62
End: 2022-10-15

## 2022-10-16 ENCOUNTER — LAB OUTSIDE AN ENCOUNTER (OUTPATIENT)
Dept: URBAN - METROPOLITAN AREA CLINIC 7 | Facility: CLINIC | Age: 62
End: 2022-10-16

## 2022-10-17 ENCOUNTER — LAB OUTSIDE AN ENCOUNTER (OUTPATIENT)
Dept: URBAN - METROPOLITAN AREA CLINIC 7 | Facility: CLINIC | Age: 62
End: 2022-10-17

## 2022-10-18 ENCOUNTER — TELEPHONE ENCOUNTER (OUTPATIENT)
Dept: URBAN - METROPOLITAN AREA CLINIC 7 | Facility: CLINIC | Age: 62
End: 2022-10-18

## 2022-10-19 ENCOUNTER — LAB OUTSIDE AN ENCOUNTER (OUTPATIENT)
Dept: URBAN - METROPOLITAN AREA CLINIC 7 | Facility: CLINIC | Age: 62
End: 2022-10-19

## 2022-10-21 ENCOUNTER — LAB OUTSIDE AN ENCOUNTER (OUTPATIENT)
Dept: URBAN - METROPOLITAN AREA CLINIC 7 | Facility: CLINIC | Age: 62
End: 2022-10-21

## 2022-10-22 ENCOUNTER — LAB OUTSIDE AN ENCOUNTER (OUTPATIENT)
Dept: URBAN - METROPOLITAN AREA CLINIC 7 | Facility: CLINIC | Age: 62
End: 2022-10-22

## 2022-10-23 ENCOUNTER — LAB OUTSIDE AN ENCOUNTER (OUTPATIENT)
Dept: URBAN - METROPOLITAN AREA CLINIC 7 | Facility: CLINIC | Age: 62
End: 2022-10-23

## 2022-10-25 ENCOUNTER — LAB OUTSIDE AN ENCOUNTER (OUTPATIENT)
Dept: URBAN - METROPOLITAN AREA CLINIC 7 | Facility: CLINIC | Age: 62
End: 2022-10-25

## 2022-10-27 ENCOUNTER — LAB OUTSIDE AN ENCOUNTER (OUTPATIENT)
Dept: URBAN - METROPOLITAN AREA CLINIC 7 | Facility: CLINIC | Age: 62
End: 2022-10-27

## 2022-10-27 ENCOUNTER — TELEPHONE ENCOUNTER (OUTPATIENT)
Dept: URBAN - METROPOLITAN AREA CLINIC 7 | Facility: CLINIC | Age: 62
End: 2022-10-27

## 2022-10-27 ENCOUNTER — CLAIMS CREATED FROM THE CLAIM WINDOW (OUTPATIENT)
Dept: URBAN - METROPOLITAN AREA SURGERY CENTER 5 | Facility: SURGERY CENTER | Age: 62
End: 2022-10-27
Payer: OTHER GOVERNMENT

## 2022-10-27 DIAGNOSIS — K76.6 CLINICALLY SIGNIFICANT PORTAL HYPERTENSION: ICD-10-CM

## 2022-10-27 DIAGNOSIS — I85.00 ESOPHAGEAL  VARICOSE VEINS: ICD-10-CM

## 2022-10-27 DIAGNOSIS — K31.819 ACQUIRED ARTERIOVENOUS MALFORMATION OF STOMACH: ICD-10-CM

## 2022-10-27 DIAGNOSIS — K31.89 ACQUIRED DEFORMITY OF DUODENUM: ICD-10-CM

## 2022-10-27 DIAGNOSIS — K22.89 DILATATION OF ESOPHAGUS: ICD-10-CM

## 2022-10-27 PROCEDURE — 43235 EGD DIAGNOSTIC BRUSH WASH: CPT | Performed by: INTERNAL MEDICINE

## 2022-10-27 RX ORDER — TORSEMIDE 20 MG/1
AS DIRECTED TABLET ORAL
Status: ACTIVE | COMMUNITY

## 2022-10-27 RX ORDER — CALCIUM CARBONATE 300MG(750)
AS DIRECTED TABLET,CHEWABLE ORAL
Status: ACTIVE | COMMUNITY

## 2022-10-27 RX ORDER — PANTOPRAZOLE SODIUM 40 MG/1
1 TABLET TABLET, DELAYED RELEASE ORAL TWICE A DAY
Status: ACTIVE | COMMUNITY

## 2022-10-27 RX ORDER — NADOLOL 20 MG/1
2 TABLETS TABLET ORAL ONCE A DAY
Status: ACTIVE | COMMUNITY

## 2022-10-27 RX ORDER — SPIRONOLACTONE 25 MG/1
TABLET ORAL DAILY
Qty: 0 | Refills: 5 | Status: ACTIVE | COMMUNITY

## 2022-10-27 RX ORDER — ASPIRIN 81 MG/1
1 TABLET TABLET, COATED ORAL ONCE A DAY
Status: ACTIVE | COMMUNITY

## 2022-10-27 RX ORDER — SUCRALFATE 1 G/1
1 TABLET ON AN EMPTY STOMACH TABLET ORAL
Qty: 63 | OUTPATIENT
Start: 2022-10-27 | End: 2022-11-16

## 2022-10-27 RX ORDER — FERROUS SULFATE 325(65) MG
1 TABLET TABLET ORAL ONCE A DAY
Status: ACTIVE | COMMUNITY

## 2022-10-28 ENCOUNTER — LAB OUTSIDE AN ENCOUNTER (OUTPATIENT)
Dept: URBAN - METROPOLITAN AREA CLINIC 7 | Facility: CLINIC | Age: 62
End: 2022-10-28

## 2022-10-29 ENCOUNTER — LAB OUTSIDE AN ENCOUNTER (OUTPATIENT)
Dept: URBAN - METROPOLITAN AREA CLINIC 7 | Facility: CLINIC | Age: 62
End: 2022-10-29

## 2022-10-31 ENCOUNTER — LAB OUTSIDE AN ENCOUNTER (OUTPATIENT)
Dept: URBAN - METROPOLITAN AREA CLINIC 7 | Facility: CLINIC | Age: 62
End: 2022-10-31

## 2022-11-02 ENCOUNTER — LAB OUTSIDE AN ENCOUNTER (OUTPATIENT)
Dept: URBAN - METROPOLITAN AREA CLINIC 7 | Facility: CLINIC | Age: 62
End: 2022-11-02

## 2022-11-03 ENCOUNTER — LAB OUTSIDE AN ENCOUNTER (OUTPATIENT)
Dept: URBAN - METROPOLITAN AREA CLINIC 7 | Facility: CLINIC | Age: 62
End: 2022-11-03

## 2022-11-04 ENCOUNTER — LAB OUTSIDE AN ENCOUNTER (OUTPATIENT)
Dept: URBAN - METROPOLITAN AREA CLINIC 7 | Facility: CLINIC | Age: 62
End: 2022-11-04

## 2022-11-06 ENCOUNTER — LAB OUTSIDE AN ENCOUNTER (OUTPATIENT)
Dept: URBAN - METROPOLITAN AREA CLINIC 7 | Facility: CLINIC | Age: 62
End: 2022-11-06

## 2022-11-07 ENCOUNTER — OFFICE VISIT (OUTPATIENT)
Dept: URBAN - METROPOLITAN AREA CLINIC 7 | Facility: CLINIC | Age: 62
End: 2022-11-07
Payer: OTHER GOVERNMENT

## 2022-11-07 VITALS
BODY MASS INDEX: 38.7 KG/M2 | HEIGHT: 73 IN | SYSTOLIC BLOOD PRESSURE: 122 MMHG | WEIGHT: 292 LBS | DIASTOLIC BLOOD PRESSURE: 76 MMHG | TEMPERATURE: 97.6 F

## 2022-11-07 DIAGNOSIS — K70.31 ALCOHOLIC CIRRHOSIS OF LIVER WITH ASCITES: ICD-10-CM

## 2022-11-07 DIAGNOSIS — E11.9 DIABETES MELLITUS: ICD-10-CM

## 2022-11-07 DIAGNOSIS — I50.9 CONGESTIVE HEART FAILURE: ICD-10-CM

## 2022-11-07 DIAGNOSIS — I27.20 PULMONARY HYPERTENSION: ICD-10-CM

## 2022-11-07 DIAGNOSIS — D69.6 THROMBOCYTOPENIA: ICD-10-CM

## 2022-11-07 DIAGNOSIS — R06.00 DYSPNEA: ICD-10-CM

## 2022-11-07 DIAGNOSIS — R18.8 ASCITES: ICD-10-CM

## 2022-11-07 DIAGNOSIS — E87.70 FLUID OVERLOAD: ICD-10-CM

## 2022-11-07 DIAGNOSIS — Z86.010 HISTORY OF COLON POLYPS: ICD-10-CM

## 2022-11-07 DIAGNOSIS — Z95.828 S/P TIPS (TRANSJUGULAR INTRAHEPATIC PORTOSYSTEMIC SHUNT): ICD-10-CM

## 2022-11-07 PROCEDURE — 99214 OFFICE O/P EST MOD 30 MIN: CPT | Performed by: INTERNAL MEDICINE

## 2022-11-07 RX ORDER — SUCRALFATE 1 G/1
1 TABLET ON AN EMPTY STOMACH TABLET ORAL
Qty: 63 | Status: ACTIVE | COMMUNITY
Start: 2022-10-27 | End: 2022-11-16

## 2022-11-07 RX ORDER — ASPIRIN 81 MG/1
1 TABLET TABLET, COATED ORAL ONCE A DAY
Status: ACTIVE | COMMUNITY

## 2022-11-07 RX ORDER — NADOLOL 20 MG/1
2 TABLETS TABLET ORAL ONCE A DAY
Status: ACTIVE | COMMUNITY

## 2022-11-07 RX ORDER — SPIRONOLACTONE 100 MG/1
1 TABLET TABLET, FILM COATED ORAL DAILY
Refills: 5 | Status: ACTIVE | COMMUNITY

## 2022-11-07 RX ORDER — CALCIUM CARBONATE 300MG(750)
AS DIRECTED TABLET,CHEWABLE ORAL
Status: ACTIVE | COMMUNITY

## 2022-11-07 RX ORDER — SUCRALFATE 1 G/1
1 TABLET ON AN EMPTY STOMACH TABLET ORAL TWICE A DAY
Status: ACTIVE | COMMUNITY

## 2022-11-07 RX ORDER — PANTOPRAZOLE SODIUM 40 MG/1
1 TABLET TABLET, DELAYED RELEASE ORAL TWICE A DAY
Status: ACTIVE | COMMUNITY

## 2022-11-07 RX ORDER — FERROUS SULFATE 325(65) MG
1 TABLET TABLET ORAL ONCE A DAY
Status: ACTIVE | COMMUNITY

## 2022-11-07 NOTE — HPI-TODAY'S VISIT:
LV 9/2022. Following him for decompensated EtOH cirrhosis with recurrent large volume ascites treated with diuretics and historically complicated by mild hyponatremia. He finally had TIPS 4/25/22, but unforunately despite this, developed recurrent ascites, issues with pulm edema and then had TIPS reversed in mid 2022. EGD 4/16/21 with variable Z-line (no serra's), no EV, small HH, mild PHG. Colon 8/2020 with 3 TA, normal colon bx, diverticulosis. CT liver in 7/2021 was negative for hepatic masses and no evidence of PVT. In 2020, neg AMA, ASMA, normal iron, negative celiac testing. Has been seen at liver txp center at this point (at [facility]). AFP 3.9. ECHO in 2020 with normal LVEF and RVSP. Prior to TIPS, had been getting weekly paracenteses for months (usually large volume, >6 liters). Urine sodium was low. Labs 11/9/21 with Hgb 13.4, plts 107, Cr 1.0, Na 137. Aside from ascites, he has not had any other signs of decompensation. No GI bleeding, no jaundice. Unfortunately, his TIPS was complicated by volume overload necessitating diuretic use (heart failure symptoms). Recent labs prior to LV with normal Cr, , MELD-Na 13. Ammonia in the 50s. Prior to last visit, had 3 IV diuresis treatments since his TIPS was placed due to voluem overload. Was still having some shortness of breath, dyspnea on exertion, conversationally dyspneic. Dyspnea was biggest issue. Was taking torsemide 40 mg BID, and I added aldactone 100 mg daily. Due for MRI liver 11/2022. Recent labs in late Aug 2022 with Hgb 9.7, plts 116 (Hgb was in the 12s earlier this year), INR 1.21, Na 138, Cr 1.85, . AFP 2.5 in July 2022. BNP > 1000 in July. Para in late 8/2022 with low protein, low albumin, low PMN count, cytology neg, culture neg. Is back to having LVP once weekly. ECHO 8/2022 with EF 55-60%, low normal RV function, TAVR with good function, progressive MV stenosis. Cards wants dobutamine stress done. TIPS was downsized in July from 10 mm to 6 mm and there was reduction in atrial pressure (40 to 33 mm Hg). Remains on torsemide and aldactone. MELD 15 based on recent labs. HVPG of 8 after downsizing, as portal pressure and RA pressure were essentially equal prior to shunt downsize. Was told he needs repair of his mitral valve. Was on torsemide 60 mg BID, 100 mg aldactone daily. Ultrafiltration was recommended by cards, and he did see nephrology. He does have LE edema, and ascites. I ultimately advised that he be hospitalized given his dyspnea issues. Dobutamine stress echo done 9/21/2022 demonstrated a left ventricular EF of 55 to 60%, normal right ventricular and left ventricular systolic function, progressive moderate mitral valve stenosis with a mean gradient of 9 mmHg. Left ventricular function is estimated to be hyperdynamic with an ejection fraction of over 65% after stress.  Severe mitral valve stenosis post-rest with a mean gradient of 14 mmHg.  Patient did have a right heart cath on September 16, 2022 which demonstrated severe pulmonary hypertension (64/24 with a mean of 43) with positive vasoactive adenosine challenge with a pulmonary pressure that decreased to 52/13 with a mean of 33.  He also had TIPS closure 9/2022. Labs 9/2022 demonstrated a white count of 3.6, hemoglobin 9.5, platelets of 107, INR 1.2, Na 133, creatinine 1.08, potassium 4.1.  He did have a bubble study done in the hospital which demonstrated no evidence for shunt. Patient with alcohol cirrhosis, relatively low MELD historically, refractory ascites now s/p TIPS, complicated by fluid overload, necessitating TIPS downsizing in July 2022, and now TIPS occlusion 9/2022 with improvement in shortness of breath symptoms. Cardiac testing shows pulmonary HTN (no shunt on bubble, so likely not HPS), and does have sleep apnea and mitral valve stenosis suggestive of other etiologies not related to bart-pulmonary HTN. Also had a known chronically occluded RCA. Continued with therapeutic paracenteses up to twice weekly, as he gets hyponatremic and renal insufficiency with aggressive diuretics and continues with refractory ascites. Advised that he established again with [facility] transplant to see if he would be a transplant candidate with his cardiac issues and to continue with cardiac workup. Did his EGD for variceal surveillance 10/2022 with small EV's, variable Z-line, mild PHG, the beginnings of GAVE, no GV. FU now.  He is having a NORMAN for severe mitral stenosis (is seeing cardiology and cardiac surgery). Follow up with cardiac surg on 12/4/22. His case will be reviewed in cardiac board. He is being evaluated for mitral replacement. He does have mild obstructive deficit on PFT's. He is being seen by [facility] at this point for re-evaluation. Na 135, Cr 1.21, alk phos 179, liver enzymes normal. no INR.

## 2022-11-07 NOTE — PHYSICAL EXAM GASTROINTESTINAL
Abdomen , soft, nontender, nondistended , no guarding or rigidity , no masses palpable , normal bowel sounds , Liver and Spleen , no hepatomegaly present , no hepatosplenomegaly , liver nontender , spleen not palpable no abdominal pain, no bloating, no constipation, no diarrhea, no nausea and no vomiting.

## 2022-11-08 ENCOUNTER — LAB OUTSIDE AN ENCOUNTER (OUTPATIENT)
Dept: URBAN - METROPOLITAN AREA CLINIC 7 | Facility: CLINIC | Age: 62
End: 2022-11-08

## 2022-11-09 ENCOUNTER — LAB OUTSIDE AN ENCOUNTER (OUTPATIENT)
Dept: URBAN - METROPOLITAN AREA CLINIC 7 | Facility: CLINIC | Age: 62
End: 2022-11-09

## 2022-11-10 ENCOUNTER — LAB OUTSIDE AN ENCOUNTER (OUTPATIENT)
Dept: URBAN - METROPOLITAN AREA CLINIC 7 | Facility: CLINIC | Age: 62
End: 2022-11-10

## 2022-11-12 ENCOUNTER — LAB OUTSIDE AN ENCOUNTER (OUTPATIENT)
Dept: URBAN - METROPOLITAN AREA CLINIC 7 | Facility: CLINIC | Age: 62
End: 2022-11-12

## 2022-11-14 ENCOUNTER — LAB OUTSIDE AN ENCOUNTER (OUTPATIENT)
Dept: URBAN - METROPOLITAN AREA CLINIC 7 | Facility: CLINIC | Age: 62
End: 2022-11-14

## 2022-11-15 ENCOUNTER — LAB OUTSIDE AN ENCOUNTER (OUTPATIENT)
Dept: URBAN - METROPOLITAN AREA CLINIC 7 | Facility: CLINIC | Age: 62
End: 2022-11-15

## 2022-11-16 ENCOUNTER — LAB OUTSIDE AN ENCOUNTER (OUTPATIENT)
Dept: URBAN - METROPOLITAN AREA CLINIC 7 | Facility: CLINIC | Age: 62
End: 2022-11-16

## 2022-11-18 ENCOUNTER — LAB OUTSIDE AN ENCOUNTER (OUTPATIENT)
Dept: URBAN - METROPOLITAN AREA CLINIC 7 | Facility: CLINIC | Age: 62
End: 2022-11-18

## 2022-11-20 ENCOUNTER — LAB OUTSIDE AN ENCOUNTER (OUTPATIENT)
Dept: URBAN - METROPOLITAN AREA CLINIC 7 | Facility: CLINIC | Age: 62
End: 2022-11-20

## 2022-11-21 ENCOUNTER — LAB OUTSIDE AN ENCOUNTER (OUTPATIENT)
Dept: URBAN - METROPOLITAN AREA CLINIC 7 | Facility: CLINIC | Age: 62
End: 2022-11-21

## 2022-11-22 ENCOUNTER — LAB OUTSIDE AN ENCOUNTER (OUTPATIENT)
Dept: URBAN - METROPOLITAN AREA CLINIC 7 | Facility: CLINIC | Age: 62
End: 2022-11-22

## 2022-11-24 ENCOUNTER — LAB OUTSIDE AN ENCOUNTER (OUTPATIENT)
Dept: URBAN - METROPOLITAN AREA CLINIC 7 | Facility: CLINIC | Age: 62
End: 2022-11-24

## 2022-11-26 ENCOUNTER — LAB OUTSIDE AN ENCOUNTER (OUTPATIENT)
Dept: URBAN - METROPOLITAN AREA CLINIC 7 | Facility: CLINIC | Age: 62
End: 2022-11-26

## 2022-11-27 ENCOUNTER — LAB OUTSIDE AN ENCOUNTER (OUTPATIENT)
Dept: URBAN - METROPOLITAN AREA CLINIC 7 | Facility: CLINIC | Age: 62
End: 2022-11-27

## 2022-11-28 ENCOUNTER — LAB OUTSIDE AN ENCOUNTER (OUTPATIENT)
Dept: URBAN - METROPOLITAN AREA CLINIC 7 | Facility: CLINIC | Age: 62
End: 2022-11-28

## 2022-11-30 ENCOUNTER — LAB OUTSIDE AN ENCOUNTER (OUTPATIENT)
Dept: URBAN - METROPOLITAN AREA CLINIC 7 | Facility: CLINIC | Age: 62
End: 2022-11-30

## 2022-12-02 ENCOUNTER — LAB OUTSIDE AN ENCOUNTER (OUTPATIENT)
Dept: URBAN - METROPOLITAN AREA CLINIC 7 | Facility: CLINIC | Age: 62
End: 2022-12-02

## 2022-12-03 ENCOUNTER — LAB OUTSIDE AN ENCOUNTER (OUTPATIENT)
Dept: URBAN - METROPOLITAN AREA CLINIC 7 | Facility: CLINIC | Age: 62
End: 2022-12-03

## 2022-12-04 ENCOUNTER — LAB OUTSIDE AN ENCOUNTER (OUTPATIENT)
Dept: URBAN - METROPOLITAN AREA CLINIC 7 | Facility: CLINIC | Age: 62
End: 2022-12-04

## 2022-12-06 ENCOUNTER — LAB OUTSIDE AN ENCOUNTER (OUTPATIENT)
Dept: URBAN - METROPOLITAN AREA CLINIC 7 | Facility: CLINIC | Age: 62
End: 2022-12-06

## 2022-12-08 ENCOUNTER — LAB OUTSIDE AN ENCOUNTER (OUTPATIENT)
Dept: URBAN - METROPOLITAN AREA CLINIC 7 | Facility: CLINIC | Age: 62
End: 2022-12-08

## 2022-12-09 ENCOUNTER — LAB OUTSIDE AN ENCOUNTER (OUTPATIENT)
Dept: URBAN - METROPOLITAN AREA CLINIC 7 | Facility: CLINIC | Age: 62
End: 2022-12-09

## 2022-12-10 ENCOUNTER — LAB OUTSIDE AN ENCOUNTER (OUTPATIENT)
Dept: URBAN - METROPOLITAN AREA CLINIC 7 | Facility: CLINIC | Age: 62
End: 2022-12-10

## 2022-12-12 ENCOUNTER — LAB OUTSIDE AN ENCOUNTER (OUTPATIENT)
Dept: URBAN - METROPOLITAN AREA CLINIC 7 | Facility: CLINIC | Age: 62
End: 2022-12-12

## 2022-12-14 ENCOUNTER — LAB OUTSIDE AN ENCOUNTER (OUTPATIENT)
Dept: URBAN - METROPOLITAN AREA CLINIC 7 | Facility: CLINIC | Age: 62
End: 2022-12-14

## 2022-12-15 ENCOUNTER — LAB OUTSIDE AN ENCOUNTER (OUTPATIENT)
Dept: URBAN - METROPOLITAN AREA CLINIC 7 | Facility: CLINIC | Age: 62
End: 2022-12-15

## 2022-12-16 ENCOUNTER — LAB OUTSIDE AN ENCOUNTER (OUTPATIENT)
Dept: URBAN - METROPOLITAN AREA CLINIC 7 | Facility: CLINIC | Age: 62
End: 2022-12-16

## 2022-12-18 ENCOUNTER — LAB OUTSIDE AN ENCOUNTER (OUTPATIENT)
Dept: URBAN - METROPOLITAN AREA CLINIC 7 | Facility: CLINIC | Age: 62
End: 2022-12-18

## 2022-12-19 ENCOUNTER — OFFICE VISIT (OUTPATIENT)
Dept: URBAN - METROPOLITAN AREA CLINIC 7 | Facility: CLINIC | Age: 62
End: 2022-12-19
Payer: OTHER GOVERNMENT

## 2022-12-19 VITALS
HEIGHT: 73 IN | SYSTOLIC BLOOD PRESSURE: 120 MMHG | RESPIRATION RATE: 16 BRPM | DIASTOLIC BLOOD PRESSURE: 80 MMHG | WEIGHT: 305 LBS | BODY MASS INDEX: 40.42 KG/M2 | TEMPERATURE: 97.8 F

## 2022-12-19 DIAGNOSIS — R18.8 ASCITES: ICD-10-CM

## 2022-12-19 DIAGNOSIS — E87.70 FLUID OVERLOAD: ICD-10-CM

## 2022-12-19 DIAGNOSIS — K70.31 ALCOHOLIC CIRRHOSIS OF LIVER WITH ASCITES: ICD-10-CM

## 2022-12-19 DIAGNOSIS — E11.9 DIABETES MELLITUS: ICD-10-CM

## 2022-12-19 DIAGNOSIS — R06.00 DYSPNEA: ICD-10-CM

## 2022-12-19 DIAGNOSIS — I50.9 CONGESTIVE HEART FAILURE: ICD-10-CM

## 2022-12-19 DIAGNOSIS — Z95.828 S/P TIPS (TRANSJUGULAR INTRAHEPATIC PORTOSYSTEMIC SHUNT): ICD-10-CM

## 2022-12-19 DIAGNOSIS — I27.20 PULMONARY HYPERTENSION: ICD-10-CM

## 2022-12-19 DIAGNOSIS — Z86.010 HISTORY OF COLON POLYPS: ICD-10-CM

## 2022-12-19 DIAGNOSIS — D69.6 THROMBOCYTOPENIA: ICD-10-CM

## 2022-12-19 PROBLEM — 70995007: Status: ACTIVE | Noted: 2022-09-25

## 2022-12-19 PROCEDURE — 99214 OFFICE O/P EST MOD 30 MIN: CPT | Performed by: INTERNAL MEDICINE

## 2022-12-19 RX ORDER — ASPIRIN 81 MG/1
1 TABLET TABLET, COATED ORAL ONCE A DAY
Status: ACTIVE | COMMUNITY

## 2022-12-19 RX ORDER — DILTIAZEM HYDROCHLORIDE 120 MG/1
1 CAPSULE CAPSULE, EXTENDED RELEASE ORAL ONCE A DAY
Status: ACTIVE | COMMUNITY

## 2022-12-19 RX ORDER — PANTOPRAZOLE SODIUM 40 MG/1
1 TABLET TABLET, DELAYED RELEASE ORAL TWICE A DAY
Status: ACTIVE | COMMUNITY

## 2022-12-19 RX ORDER — NADOLOL 20 MG/1
2 TABLETS TABLET ORAL ONCE A DAY
Status: ACTIVE | COMMUNITY

## 2022-12-19 RX ORDER — FERROUS SULFATE 325(65) MG
1 TABLET TABLET ORAL ONCE A DAY
Status: ACTIVE | COMMUNITY

## 2022-12-19 RX ORDER — CALCIUM CARBONATE 300MG(750)
AS DIRECTED TABLET,CHEWABLE ORAL
Status: ACTIVE | COMMUNITY

## 2022-12-19 RX ORDER — SPIRONOLACTONE 100 MG/1
1 TABLET TABLET, FILM COATED ORAL DAILY
Refills: 5 | Status: ACTIVE | COMMUNITY

## 2022-12-19 RX ORDER — SUCRALFATE 1 G/1
1 TABLET ON AN EMPTY STOMACH TABLET ORAL TWICE A DAY
Status: ACTIVE | COMMUNITY

## 2022-12-19 NOTE — HPI-TODAY'S VISIT:
LV 11/2022. Following him for decompensated EtOH cirrhosis with recurrent large volume ascites treated with diuretics and historically complicated by mild hyponatremia. He finally had TIPS 4/25/22, but unforunately despite this, developed recurrent ascites, issues with pulm edema and then had TIPS reversed in mid 2022. EGD 4/16/21 with variable Z-line (no serra's), no EV, small HH, mild PHG. Colon 8/2020 with 3 TA, normal colon bx, diverticulosis. CT liver in 7/2021 was negative for hepatic masses and no evidence of PVT. In 2020, neg AMA, ASMA, normal iron, negative celiac testing. Has been seen at liver txp center at this point (at [facility]). AFP 3.9. ECHO in 2020 with normal LVEF and RVSP. Prior to TIPS, had been getting weekly paracenteses for months (usually large volume, >6 liters). Urine sodium was low. Labs 11/9/21 with Hgb 13.4, plts 107, Cr 1.0, Na 137. Aside from ascites, he has not had any other signs of decompensation. No GI bleeding, no jaundice. Unfortunately, his TIPS was complicated by volume overload necessitating diuretic use (heart failure symptoms). Recent labs in late Aug 2022 with Hgb 9.7, plts 116 (Hgb was in the 12s earlier this year), INR 1.21, Na 138, Cr 1.85, . AFP 2.5 in July 2022. BNP > 1000 in July. Para in late 8/2022 with low protein, low albumin, low PMN count, cytology neg, culture neg. ECHO 8/2022 with EF 55-60%, low normal RV function, TAVR with good function, progressive MV stenosis. TIPS was downsized in July from 10 mm to 6 mm and there was reduction in atrial pressure (40 to 33 mm Hg) and ultimately TIPS reversed. Was told he needs repair of his mitral valve. I ultimately advised that he be hospitalized given his dyspnea issues. Dobutamine stress echo done 9/21/2022 demonstrated a left ventricular EF of 55 to 60%, normal right ventricular and left ventricular systolic function, progressive moderate mitral valve stenosis with a mean gradient of 9 mmHg. Left ventricular function is estimated to be hyperdynamic with an ejection fraction of over 65% after stress.  Severe mitral valve stenosis post-rest with a mean gradient of 14 mmHg.  Patient did have a right heart cath on September 16, 2022 which demonstrated severe pulmonary hypertension (64/24 with a mean of 43) with positive vasoactive adenosine challenge with a pulmonary pressure that decreased to 52/13 with a mean of 33.  He also had TIPS closure 9/2022. Labs 9/2022 demonstrated a white count of 3.6, hemoglobin 9.5, platelets of 107, INR 1.2, Na 133, creatinine 1.08, potassium 4.1. Bubble study negative in 9/2022. Patient with alcohol cirrhosis, relatively low MELD historically, refractory ascites now s/p TIPS, complicated by fluid overload, necessitating TIPS downsizing in July 2022, and now TIPS occlusion 9/2022 with improvement in shortness of breath symptoms. Cardiac testing shows pulmonary HTN (no shunt on bubble, so likely not HPS), and does have sleep apnea and mitral valve stenosis suggestive of other etiologies not related to bart-pulmonary HTN. Also had a known chronically occluded RCA. Continued with therapeutic paracenteses up to twice weekly, as he gets hyponatremic and renal insufficiency with aggressive diuretics and continues with refractory ascites. Have advised that he once again establish with a transplant center. Did his EGD for variceal surveillance 10/2022 with small EV's, variable Z-line, mild PHG, the beginnings of GAVE, no GV. He was to have a NORMAN for severe mitral stenosis (is seeing cardiology and cardiac surgery). Follow up with cardiac surg on 12/4/22. His case will be reviewed in cardiac board. He is being evaluated for mitral replacement. He does have mild obstructive deficit on PFT's as well. Was being seen by [facility] transplant. Na 135, Cr 1.21, alk phos 179, liver enzymes normal. no INR. Plan was to continue para's as needed for ascites, complete cardiac workup (and may need mitral replacement), MELD labs every 6 weeks, and eval by transplant center ([facility]). He does have gynecomastia from aldactone, so will decrease his dose to 25 mg in the AM, and on 40 mg of torsemide. MRI liver was negative for HCC 10/2022 so due for HCC screening in 10/2023. EGD in 10/2023. FU now.  He has seen [facility] for transplant, but has a low MELD score. He has been evaluated by cardiology locally here, and has been referred to cardiology at [facility] for another workup. He has lost 40 lbs of fluid overall. Still needing para's twice weekly. Torsemide once daily. Overall very stable. MELD 8.

## 2022-12-19 NOTE — PHYSICAL EXAM HENT:
Head,  normocephalic,  atraumatic,  Face,  Face within normal limits,  Ears,  External ears within normal limits,  Nose/Nasopharynx,  External nose  normal appearance,  nares patent,  no nasal discharge,  Mouth and Throat,  Oral cavity appearance normal,  Breath odor normal,  Lips,  Appearance normal Detail Level: Zone Photo Preface (Leave Blank If You Do Not Want): Photographs were obtained today

## 2022-12-20 ENCOUNTER — LAB OUTSIDE AN ENCOUNTER (OUTPATIENT)
Dept: URBAN - METROPOLITAN AREA CLINIC 7 | Facility: CLINIC | Age: 62
End: 2022-12-20

## 2022-12-21 ENCOUNTER — LAB OUTSIDE AN ENCOUNTER (OUTPATIENT)
Dept: URBAN - METROPOLITAN AREA CLINIC 7 | Facility: CLINIC | Age: 62
End: 2022-12-21

## 2022-12-22 ENCOUNTER — LAB OUTSIDE AN ENCOUNTER (OUTPATIENT)
Dept: URBAN - METROPOLITAN AREA CLINIC 7 | Facility: CLINIC | Age: 62
End: 2022-12-22

## 2022-12-24 ENCOUNTER — LAB OUTSIDE AN ENCOUNTER (OUTPATIENT)
Dept: URBAN - METROPOLITAN AREA CLINIC 7 | Facility: CLINIC | Age: 62
End: 2022-12-24

## 2022-12-26 ENCOUNTER — LAB OUTSIDE AN ENCOUNTER (OUTPATIENT)
Dept: URBAN - METROPOLITAN AREA CLINIC 7 | Facility: CLINIC | Age: 62
End: 2022-12-26

## 2022-12-27 ENCOUNTER — LAB OUTSIDE AN ENCOUNTER (OUTPATIENT)
Dept: URBAN - METROPOLITAN AREA CLINIC 7 | Facility: CLINIC | Age: 62
End: 2022-12-27

## 2022-12-28 ENCOUNTER — LAB OUTSIDE AN ENCOUNTER (OUTPATIENT)
Dept: URBAN - METROPOLITAN AREA CLINIC 7 | Facility: CLINIC | Age: 62
End: 2022-12-28

## 2022-12-30 ENCOUNTER — LAB OUTSIDE AN ENCOUNTER (OUTPATIENT)
Dept: URBAN - METROPOLITAN AREA CLINIC 7 | Facility: CLINIC | Age: 62
End: 2022-12-30

## 2023-01-01 ENCOUNTER — LAB OUTSIDE AN ENCOUNTER (OUTPATIENT)
Dept: URBAN - METROPOLITAN AREA CLINIC 7 | Facility: CLINIC | Age: 63
End: 2023-01-01

## 2023-01-02 ENCOUNTER — LAB OUTSIDE AN ENCOUNTER (OUTPATIENT)
Dept: URBAN - METROPOLITAN AREA CLINIC 7 | Facility: CLINIC | Age: 63
End: 2023-01-02

## 2023-01-03 ENCOUNTER — LAB OUTSIDE AN ENCOUNTER (OUTPATIENT)
Dept: URBAN - METROPOLITAN AREA CLINIC 7 | Facility: CLINIC | Age: 63
End: 2023-01-03

## 2023-01-05 ENCOUNTER — LAB OUTSIDE AN ENCOUNTER (OUTPATIENT)
Dept: URBAN - METROPOLITAN AREA CLINIC 7 | Facility: CLINIC | Age: 63
End: 2023-01-05

## 2023-01-07 ENCOUNTER — LAB OUTSIDE AN ENCOUNTER (OUTPATIENT)
Dept: URBAN - METROPOLITAN AREA CLINIC 7 | Facility: CLINIC | Age: 63
End: 2023-01-07

## 2023-01-08 ENCOUNTER — LAB OUTSIDE AN ENCOUNTER (OUTPATIENT)
Dept: URBAN - METROPOLITAN AREA CLINIC 7 | Facility: CLINIC | Age: 63
End: 2023-01-08

## 2023-01-09 ENCOUNTER — LAB OUTSIDE AN ENCOUNTER (OUTPATIENT)
Dept: URBAN - METROPOLITAN AREA CLINIC 7 | Facility: CLINIC | Age: 63
End: 2023-01-09

## 2023-01-11 ENCOUNTER — LAB OUTSIDE AN ENCOUNTER (OUTPATIENT)
Dept: URBAN - METROPOLITAN AREA CLINIC 7 | Facility: CLINIC | Age: 63
End: 2023-01-11

## 2023-01-13 ENCOUNTER — LAB OUTSIDE AN ENCOUNTER (OUTPATIENT)
Dept: URBAN - METROPOLITAN AREA CLINIC 7 | Facility: CLINIC | Age: 63
End: 2023-01-13

## 2023-01-14 ENCOUNTER — LAB OUTSIDE AN ENCOUNTER (OUTPATIENT)
Dept: URBAN - METROPOLITAN AREA CLINIC 7 | Facility: CLINIC | Age: 63
End: 2023-01-14

## 2023-01-15 ENCOUNTER — LAB OUTSIDE AN ENCOUNTER (OUTPATIENT)
Dept: URBAN - METROPOLITAN AREA CLINIC 7 | Facility: CLINIC | Age: 63
End: 2023-01-15

## 2023-01-17 ENCOUNTER — LAB OUTSIDE AN ENCOUNTER (OUTPATIENT)
Dept: URBAN - METROPOLITAN AREA CLINIC 7 | Facility: CLINIC | Age: 63
End: 2023-01-17

## 2023-01-19 ENCOUNTER — LAB OUTSIDE AN ENCOUNTER (OUTPATIENT)
Dept: URBAN - METROPOLITAN AREA CLINIC 7 | Facility: CLINIC | Age: 63
End: 2023-01-19

## 2023-01-20 ENCOUNTER — LAB OUTSIDE AN ENCOUNTER (OUTPATIENT)
Dept: URBAN - METROPOLITAN AREA CLINIC 7 | Facility: CLINIC | Age: 63
End: 2023-01-20

## 2023-01-21 ENCOUNTER — LAB OUTSIDE AN ENCOUNTER (OUTPATIENT)
Dept: URBAN - METROPOLITAN AREA CLINIC 7 | Facility: CLINIC | Age: 63
End: 2023-01-21

## 2023-01-23 ENCOUNTER — LAB OUTSIDE AN ENCOUNTER (OUTPATIENT)
Dept: URBAN - METROPOLITAN AREA CLINIC 7 | Facility: CLINIC | Age: 63
End: 2023-01-23

## 2023-01-25 ENCOUNTER — LAB OUTSIDE AN ENCOUNTER (OUTPATIENT)
Dept: URBAN - METROPOLITAN AREA CLINIC 7 | Facility: CLINIC | Age: 63
End: 2023-01-25

## 2023-01-26 ENCOUNTER — LAB OUTSIDE AN ENCOUNTER (OUTPATIENT)
Dept: URBAN - METROPOLITAN AREA CLINIC 7 | Facility: CLINIC | Age: 63
End: 2023-01-26

## 2023-01-27 ENCOUNTER — LAB OUTSIDE AN ENCOUNTER (OUTPATIENT)
Dept: URBAN - METROPOLITAN AREA CLINIC 7 | Facility: CLINIC | Age: 63
End: 2023-01-27

## 2023-01-29 ENCOUNTER — LAB OUTSIDE AN ENCOUNTER (OUTPATIENT)
Dept: URBAN - METROPOLITAN AREA CLINIC 7 | Facility: CLINIC | Age: 63
End: 2023-01-29

## 2023-01-31 ENCOUNTER — LAB OUTSIDE AN ENCOUNTER (OUTPATIENT)
Dept: URBAN - METROPOLITAN AREA CLINIC 7 | Facility: CLINIC | Age: 63
End: 2023-01-31

## 2023-02-01 ENCOUNTER — LAB OUTSIDE AN ENCOUNTER (OUTPATIENT)
Dept: URBAN - METROPOLITAN AREA CLINIC 7 | Facility: CLINIC | Age: 63
End: 2023-02-01

## 2023-02-02 ENCOUNTER — LAB OUTSIDE AN ENCOUNTER (OUTPATIENT)
Dept: URBAN - METROPOLITAN AREA CLINIC 7 | Facility: CLINIC | Age: 63
End: 2023-02-02

## 2023-02-04 ENCOUNTER — LAB OUTSIDE AN ENCOUNTER (OUTPATIENT)
Dept: URBAN - METROPOLITAN AREA CLINIC 7 | Facility: CLINIC | Age: 63
End: 2023-02-04

## 2023-02-06 ENCOUNTER — LAB OUTSIDE AN ENCOUNTER (OUTPATIENT)
Dept: URBAN - METROPOLITAN AREA CLINIC 7 | Facility: CLINIC | Age: 63
End: 2023-02-06

## 2023-02-07 ENCOUNTER — LAB OUTSIDE AN ENCOUNTER (OUTPATIENT)
Dept: URBAN - METROPOLITAN AREA CLINIC 7 | Facility: CLINIC | Age: 63
End: 2023-02-07

## 2023-02-08 ENCOUNTER — LAB OUTSIDE AN ENCOUNTER (OUTPATIENT)
Dept: URBAN - METROPOLITAN AREA CLINIC 7 | Facility: CLINIC | Age: 63
End: 2023-02-08

## 2023-02-10 ENCOUNTER — LAB OUTSIDE AN ENCOUNTER (OUTPATIENT)
Dept: URBAN - METROPOLITAN AREA CLINIC 7 | Facility: CLINIC | Age: 63
End: 2023-02-10

## 2023-02-12 ENCOUNTER — LAB OUTSIDE AN ENCOUNTER (OUTPATIENT)
Dept: URBAN - METROPOLITAN AREA CLINIC 7 | Facility: CLINIC | Age: 63
End: 2023-02-12

## 2023-02-13 ENCOUNTER — WEB ENCOUNTER (OUTPATIENT)
Dept: URBAN - METROPOLITAN AREA CLINIC 7 | Facility: CLINIC | Age: 63
End: 2023-02-13

## 2023-02-13 ENCOUNTER — LAB OUTSIDE AN ENCOUNTER (OUTPATIENT)
Dept: URBAN - METROPOLITAN AREA CLINIC 7 | Facility: CLINIC | Age: 63
End: 2023-02-13

## 2023-02-14 ENCOUNTER — LAB OUTSIDE AN ENCOUNTER (OUTPATIENT)
Dept: URBAN - METROPOLITAN AREA CLINIC 7 | Facility: CLINIC | Age: 63
End: 2023-02-14

## 2023-02-16 ENCOUNTER — LAB OUTSIDE AN ENCOUNTER (OUTPATIENT)
Dept: URBAN - METROPOLITAN AREA CLINIC 7 | Facility: CLINIC | Age: 63
End: 2023-02-16

## 2023-02-18 ENCOUNTER — LAB OUTSIDE AN ENCOUNTER (OUTPATIENT)
Dept: URBAN - METROPOLITAN AREA CLINIC 7 | Facility: CLINIC | Age: 63
End: 2023-02-18

## 2023-02-19 ENCOUNTER — LAB OUTSIDE AN ENCOUNTER (OUTPATIENT)
Dept: URBAN - METROPOLITAN AREA CLINIC 7 | Facility: CLINIC | Age: 63
End: 2023-02-19

## 2023-02-20 ENCOUNTER — LAB OUTSIDE AN ENCOUNTER (OUTPATIENT)
Dept: URBAN - METROPOLITAN AREA CLINIC 7 | Facility: CLINIC | Age: 63
End: 2023-02-20

## 2023-02-20 ENCOUNTER — TELEPHONE ENCOUNTER (OUTPATIENT)
Dept: URBAN - METROPOLITAN AREA CLINIC 7 | Facility: CLINIC | Age: 63
End: 2023-02-20

## 2023-02-22 ENCOUNTER — LAB OUTSIDE AN ENCOUNTER (OUTPATIENT)
Dept: URBAN - METROPOLITAN AREA CLINIC 7 | Facility: CLINIC | Age: 63
End: 2023-02-22

## 2023-02-24 ENCOUNTER — LAB OUTSIDE AN ENCOUNTER (OUTPATIENT)
Dept: URBAN - METROPOLITAN AREA CLINIC 7 | Facility: CLINIC | Age: 63
End: 2023-02-24

## 2023-02-25 ENCOUNTER — LAB OUTSIDE AN ENCOUNTER (OUTPATIENT)
Dept: URBAN - METROPOLITAN AREA CLINIC 7 | Facility: CLINIC | Age: 63
End: 2023-02-25

## 2023-02-26 ENCOUNTER — LAB OUTSIDE AN ENCOUNTER (OUTPATIENT)
Dept: URBAN - METROPOLITAN AREA CLINIC 7 | Facility: CLINIC | Age: 63
End: 2023-02-26

## 2023-02-28 ENCOUNTER — LAB OUTSIDE AN ENCOUNTER (OUTPATIENT)
Dept: URBAN - METROPOLITAN AREA CLINIC 7 | Facility: CLINIC | Age: 63
End: 2023-02-28

## 2023-03-02 ENCOUNTER — LAB OUTSIDE AN ENCOUNTER (OUTPATIENT)
Dept: URBAN - METROPOLITAN AREA CLINIC 7 | Facility: CLINIC | Age: 63
End: 2023-03-02

## 2023-03-03 ENCOUNTER — LAB OUTSIDE AN ENCOUNTER (OUTPATIENT)
Dept: URBAN - METROPOLITAN AREA CLINIC 7 | Facility: CLINIC | Age: 63
End: 2023-03-03

## 2023-03-04 ENCOUNTER — LAB OUTSIDE AN ENCOUNTER (OUTPATIENT)
Dept: URBAN - METROPOLITAN AREA CLINIC 7 | Facility: CLINIC | Age: 63
End: 2023-03-04

## 2023-03-06 ENCOUNTER — LAB OUTSIDE AN ENCOUNTER (OUTPATIENT)
Dept: URBAN - METROPOLITAN AREA CLINIC 7 | Facility: CLINIC | Age: 63
End: 2023-03-06

## 2023-03-08 ENCOUNTER — LAB OUTSIDE AN ENCOUNTER (OUTPATIENT)
Dept: URBAN - METROPOLITAN AREA CLINIC 7 | Facility: CLINIC | Age: 63
End: 2023-03-08

## 2023-03-09 ENCOUNTER — LAB OUTSIDE AN ENCOUNTER (OUTPATIENT)
Dept: URBAN - METROPOLITAN AREA CLINIC 7 | Facility: CLINIC | Age: 63
End: 2023-03-09

## 2023-03-10 ENCOUNTER — LAB OUTSIDE AN ENCOUNTER (OUTPATIENT)
Dept: URBAN - METROPOLITAN AREA CLINIC 7 | Facility: CLINIC | Age: 63
End: 2023-03-10

## 2023-03-12 ENCOUNTER — LAB OUTSIDE AN ENCOUNTER (OUTPATIENT)
Dept: URBAN - METROPOLITAN AREA CLINIC 7 | Facility: CLINIC | Age: 63
End: 2023-03-12

## 2023-03-14 ENCOUNTER — LAB OUTSIDE AN ENCOUNTER (OUTPATIENT)
Dept: URBAN - METROPOLITAN AREA CLINIC 7 | Facility: CLINIC | Age: 63
End: 2023-03-14

## 2023-03-15 ENCOUNTER — LAB OUTSIDE AN ENCOUNTER (OUTPATIENT)
Dept: URBAN - METROPOLITAN AREA CLINIC 7 | Facility: CLINIC | Age: 63
End: 2023-03-15

## 2023-03-16 ENCOUNTER — LAB OUTSIDE AN ENCOUNTER (OUTPATIENT)
Dept: URBAN - METROPOLITAN AREA CLINIC 7 | Facility: CLINIC | Age: 63
End: 2023-03-16

## 2023-03-18 ENCOUNTER — LAB OUTSIDE AN ENCOUNTER (OUTPATIENT)
Dept: URBAN - METROPOLITAN AREA CLINIC 7 | Facility: CLINIC | Age: 63
End: 2023-03-18

## 2023-03-20 ENCOUNTER — LAB OUTSIDE AN ENCOUNTER (OUTPATIENT)
Dept: URBAN - METROPOLITAN AREA CLINIC 7 | Facility: CLINIC | Age: 63
End: 2023-03-20

## 2023-03-21 ENCOUNTER — LAB OUTSIDE AN ENCOUNTER (OUTPATIENT)
Dept: URBAN - METROPOLITAN AREA CLINIC 7 | Facility: CLINIC | Age: 63
End: 2023-03-21

## 2023-03-22 ENCOUNTER — LAB OUTSIDE AN ENCOUNTER (OUTPATIENT)
Dept: URBAN - METROPOLITAN AREA CLINIC 7 | Facility: CLINIC | Age: 63
End: 2023-03-22

## 2023-03-24 ENCOUNTER — LAB OUTSIDE AN ENCOUNTER (OUTPATIENT)
Dept: URBAN - METROPOLITAN AREA CLINIC 7 | Facility: CLINIC | Age: 63
End: 2023-03-24

## 2023-03-24 ENCOUNTER — TELEPHONE ENCOUNTER (OUTPATIENT)
Dept: URBAN - METROPOLITAN AREA CLINIC 7 | Facility: CLINIC | Age: 63
End: 2023-03-24

## 2023-03-27 ENCOUNTER — OFFICE VISIT (OUTPATIENT)
Dept: URBAN - METROPOLITAN AREA CLINIC 7 | Facility: CLINIC | Age: 63
End: 2023-03-27
Payer: OTHER GOVERNMENT

## 2023-03-27 ENCOUNTER — LAB OUTSIDE AN ENCOUNTER (OUTPATIENT)
Dept: URBAN - METROPOLITAN AREA CLINIC 7 | Facility: CLINIC | Age: 63
End: 2023-03-27

## 2023-03-27 ENCOUNTER — TELEPHONE ENCOUNTER (OUTPATIENT)
Dept: URBAN - METROPOLITAN AREA CLINIC 8 | Facility: CLINIC | Age: 63
End: 2023-03-27

## 2023-03-27 VITALS
HEIGHT: 73 IN | BODY MASS INDEX: 39.1 KG/M2 | WEIGHT: 295 LBS | DIASTOLIC BLOOD PRESSURE: 60 MMHG | HEART RATE: 80 BPM | SYSTOLIC BLOOD PRESSURE: 98 MMHG | TEMPERATURE: 97.7 F

## 2023-03-27 DIAGNOSIS — Z86.010 HISTORY OF COLON POLYPS: ICD-10-CM

## 2023-03-27 DIAGNOSIS — Z95.828 S/P TIPS (TRANSJUGULAR INTRAHEPATIC PORTOSYSTEMIC SHUNT): ICD-10-CM

## 2023-03-27 DIAGNOSIS — D69.6 THROMBOCYTOPENIA: ICD-10-CM

## 2023-03-27 DIAGNOSIS — I50.9 CONGESTIVE HEART FAILURE: ICD-10-CM

## 2023-03-27 DIAGNOSIS — R06.00 DYSPNEA: ICD-10-CM

## 2023-03-27 DIAGNOSIS — E11.9 DIABETES MELLITUS: ICD-10-CM

## 2023-03-27 DIAGNOSIS — R18.8 ASCITES: ICD-10-CM

## 2023-03-27 DIAGNOSIS — I27.20 PULMONARY HYPERTENSION: ICD-10-CM

## 2023-03-27 DIAGNOSIS — K70.31 ALCOHOLIC CIRRHOSIS OF LIVER WITH ASCITES: ICD-10-CM

## 2023-03-27 DIAGNOSIS — E87.70 FLUID OVERLOAD: ICD-10-CM

## 2023-03-27 PROBLEM — 389026000: Status: ACTIVE | Noted: 2023-03-27

## 2023-03-27 PROBLEM — 19943007: Status: ACTIVE | Noted: 2023-03-27

## 2023-03-27 PROCEDURE — 99214 OFFICE O/P EST MOD 30 MIN: CPT | Performed by: INTERNAL MEDICINE

## 2023-03-27 RX ORDER — LORATADINE 10 MG
1 TABLET TABLET ORAL ONCE A DAY
Status: ACTIVE | COMMUNITY

## 2023-03-27 RX ORDER — CALCIUM CARBONATE 300MG(750)
AS DIRECTED TABLET,CHEWABLE ORAL
Status: ACTIVE | COMMUNITY

## 2023-03-27 RX ORDER — EMPAGLIFLOZIN 10 MG/1
1 TABLET TABLET, FILM COATED ORAL ONCE A DAY
Status: ACTIVE | COMMUNITY

## 2023-03-27 RX ORDER — SPIRONOLACTONE 100 MG/1
1 TABLET TABLET, FILM COATED ORAL DAILY
Refills: 5 | Status: DISCONTINUED | COMMUNITY

## 2023-03-27 RX ORDER — POTASSIUM CHLORIDE 1.5 G/1.58G
1 PACKET WITH FOOD POWDER, FOR SOLUTION ORAL ONCE A DAY
Status: ACTIVE | COMMUNITY

## 2023-03-27 RX ORDER — EPLERENONE 25 MG/1
1 TABLET TABLET ORAL ONCE A DAY
Status: ACTIVE | COMMUNITY

## 2023-03-27 RX ORDER — FERROUS SULFATE 325(65) MG
1 TABLET TABLET ORAL ONCE A DAY
Status: ACTIVE | COMMUNITY

## 2023-03-27 RX ORDER — PANTOPRAZOLE SODIUM 40 MG/1
1 TABLET TABLET, DELAYED RELEASE ORAL TWICE A DAY
Status: ACTIVE | COMMUNITY

## 2023-03-27 RX ORDER — ASPIRIN 81 MG/1
1 TABLET TABLET, COATED ORAL ONCE A DAY
Status: ACTIVE | COMMUNITY

## 2023-03-27 RX ORDER — DILTIAZEM HYDROCHLORIDE 120 MG/1
1 CAPSULE CAPSULE, EXTENDED RELEASE ORAL ONCE A DAY
Status: ACTIVE | COMMUNITY

## 2023-03-27 RX ORDER — SUCRALFATE 1 G/1
1 TABLET ON AN EMPTY STOMACH TABLET ORAL TWICE A DAY
Status: DISCONTINUED | COMMUNITY

## 2023-03-27 RX ORDER — TORSEMIDE 20 MG/1
2 TABLET ORAL ONCE A DAY
Status: ACTIVE | COMMUNITY

## 2023-03-27 RX ORDER — NADOLOL 20 MG/1
1 TABLETS TABLET ORAL ONCE A DAY
Status: ACTIVE | COMMUNITY

## 2023-03-27 NOTE — HPI-TODAY'S VISIT:
LV 12/2022. Following him for decompensated EtOH cirrhosis with recurrent large volume ascites treated with diuretics and historically complicated by mild hyponatremia. He finally had TIPS 4/25/22, but unforunately despite this, developed recurrent ascites, issues with pulm edema and then had TIPS reversed in mid 2022. EGD 4/16/21 with variable Z-line (no serra's), no EV, small HH, mild PHG. Colon 8/2020 with 3 TA, normal colon bx, diverticulosis. CT liver in 7/2021 was negative for hepatic masses and no evidence of PVT. In 2020, neg AMA, ASMA, normal iron, negative celiac testing. Has been seen at liver txp center at this point (at [facility] [place]). AFP 3.9. ECHO in 2020 with normal LVEF and RVSP. Prior to TIPS, had been getting weekly paracenteses for months (usually large volume, >6 liters). Urine sodium was low. Labs 11/9/21 with Hgb 13.4, plts 107, Cr 1.0, Na 137. Aside from ascites, he has not had any other signs of decompensation. No GI bleeding, no jaundice. Unfortunately, his TIPS was complicated by volume overload necessitating diuretic use (heart failure symptoms). Recent labs in late Aug 2022 with Hgb 9.7, plts 116 (Hgb was in the 12s earlier this year), INR 1.21, Na 138, Cr 1.85, . AFP 2.5 in July 2022. BNP > 1000 in July. Para in late 8/2022 with low protein, low albumin, low PMN count, cytology neg, culture neg. ECHO 8/2022 with EF 55-60%, low normal RV function, TAVR with good function, progressive MV stenosis. TIPS was downsized in July from 10 mm to 6 mm and there was reduction in atrial pressure (40 to 33 mm Hg) and ultimately TIPS reversed. Was told he needs repair of his mitral valve. I ultimately advised that he be hospitalized given his dyspnea issues. Dobutamine stress echo done 9/21/2022 demonstrated a left ventricular EF of 55 to 60%, normal right ventricular and left ventricular systolic function, progressive moderate mitral valve stenosis with a mean gradient of 9 mmHg. Left ventricular function is estimated to be hyperdynamic with an ejection fraction of over 65% after stress.  Severe mitral valve stenosis post-rest with a mean gradient of 14 mmHg.  Patient did have a right heart cath on September 16, 2022 which demonstrated severe pulmonary hypertension (64/24 with a mean of 43) with positive vasoactive adenosine challenge with a pulmonary pressure that decreased to 52/13 with a mean of 33.  He also had TIPS closure 9/2022. Labs 9/2022 demonstrated a white count of 3.6, hemoglobin 9.5, platelets of 107, INR 1.2, Na 133, creatinine 1.08, potassium 4.1. Bubble study negative in 9/2022. Patient with alcohol cirrhosis, relatively low MELD historically, refractory ascites now s/p TIPS, complicated by fluid overload, necessitating TIPS downsizing in July 2022, and now TIPS occlusion 9/2022 with improvement in shortness of breath symptoms. Cardiac testing shows pulmonary HTN (no shunt on bubble, so likely not HPS), and does have sleep apnea and mitral valve stenosis suggestive of other etiologies not related to bart-pulmonary HTN. Also had a known chronically occluded RCA. Continued with therapeutic paracenteses up to twice weekly, as he gets hyponatremic and renal insufficiency with aggressive diuretics and continues with refractory ascites. Have advised that he once again establish with a transplant center. Did his EGD for variceal surveillance 10/2022 with small EV's, variable Z-line, mild PHG, the beginnings of GAVE, no GV. He was to have a NORMAN for severe mitral stenosis (is seeing cardiology and cardiac surgery). Follow up with cardiac surg on 12/4/22. His case will be reviewed in cardiac board. He is being evaluated for mitral replacement. He does have mild obstructive deficit on PFT's as well. Was being seen by [facility] transplant. Na 135, Cr 1.21, alk phos 179, liver enzymes normal. no INR. Plan was to continue para's as needed for ascites, complete cardiac workup (and may need mitral replacement), MELD labs every 6 weeks, and eval by transplant center ([facility]). He does have gynecomastia from aldactone, so will decrease his dose to 25 mg in the AM, and on 40 mg of torsemide. MRI liver was negative for HCC 10/2022 so due for HCC screening in 10/2023. EGD in 10/2023. LV, had seen [facility] for transplant, but has a low MELD score. He had also been evaluated by cardiology locally here, and has been referred to cardiology at [facility] for another workup for mitral valve. He had lost 40 lbs of fluid overall, but still needing para's twice weekly. Torsemide once daily. Overall very stable. MELD 8. Continued paracentesis weekly to twice weekly, and continue MELD labs, HCC screening in 10/2023, EGD 10/2023. Continue MELD labs every 6-8 weeks (recent Na 131, Cr 0.99, Hgb 12.5, plts 127, INR 1.1). Will get cardiac workup at [place] as well in January 2022. FU now after multiple visits with cardiology, pulmonary and there is some thought that his pulmonary HTN is in part secodary to portopulmonary hypertension with consideration of therapy for PAH.  Seen by F cards on 3/23/23 and rec was for him to be seen by CT surgery for consideration of mitral valve surgery. Seen at [facility] for transplant eval and with such a low MELD, living donor is his best option for transplant, but no viable candidate for this. On torsemide for diuretics. No clear evidence for autoimmune disorder. Seen by CT surgery 3/13/23 at [facility] and he was not thought to be a candidate for surgery at that time given that his mitral valve function was not bad - there is some discussion between thoracic/cards but no plans for surgical intervention now. He continues to have ascites which is getting tapped twice per week, large volume, which is the frustrating part for him. Still on torsemide 20 mg daily (was on 40 mg before, but not anymore).

## 2023-03-30 ENCOUNTER — LAB OUTSIDE AN ENCOUNTER (OUTPATIENT)
Dept: URBAN - METROPOLITAN AREA CLINIC 7 | Facility: CLINIC | Age: 63
End: 2023-03-30

## 2023-04-02 ENCOUNTER — LAB OUTSIDE AN ENCOUNTER (OUTPATIENT)
Dept: URBAN - METROPOLITAN AREA CLINIC 7 | Facility: CLINIC | Age: 63
End: 2023-04-02

## 2023-04-04 ENCOUNTER — TELEPHONE ENCOUNTER (OUTPATIENT)
Dept: URBAN - METROPOLITAN AREA CLINIC 7 | Facility: CLINIC | Age: 63
End: 2023-04-04

## 2023-04-05 ENCOUNTER — LAB OUTSIDE AN ENCOUNTER (OUTPATIENT)
Dept: URBAN - METROPOLITAN AREA CLINIC 7 | Facility: CLINIC | Age: 63
End: 2023-04-05

## 2023-04-08 ENCOUNTER — LAB OUTSIDE AN ENCOUNTER (OUTPATIENT)
Dept: URBAN - METROPOLITAN AREA CLINIC 7 | Facility: CLINIC | Age: 63
End: 2023-04-08

## 2023-04-11 ENCOUNTER — LAB OUTSIDE AN ENCOUNTER (OUTPATIENT)
Dept: URBAN - METROPOLITAN AREA CLINIC 7 | Facility: CLINIC | Age: 63
End: 2023-04-11

## 2023-04-13 ENCOUNTER — APPOINTMENT (RX ONLY)
Dept: URBAN - METROPOLITAN AREA CLINIC 333 | Facility: CLINIC | Age: 63
Setting detail: DERMATOLOGY
End: 2023-04-13

## 2023-04-13 DIAGNOSIS — L85.3 XEROSIS CUTIS: ICD-10-CM

## 2023-04-13 DIAGNOSIS — D22 MELANOCYTIC NEVI: ICD-10-CM

## 2023-04-13 DIAGNOSIS — T88.7XX: ICD-10-CM | Status: INADEQUATELY CONTROLLED

## 2023-04-13 DIAGNOSIS — L81.4 OTHER MELANIN HYPERPIGMENTATION: ICD-10-CM

## 2023-04-13 DIAGNOSIS — L82.1 OTHER SEBORRHEIC KERATOSIS: ICD-10-CM

## 2023-04-13 DIAGNOSIS — L20.89 OTHER ATOPIC DERMATITIS: ICD-10-CM | Status: INADEQUATELY CONTROLLED

## 2023-04-13 DIAGNOSIS — Z12.83 ENCOUNTER FOR SCREENING FOR MALIGNANT NEOPLASM OF SKIN: ICD-10-CM

## 2023-04-13 PROBLEM — D22.5 MELANOCYTIC NEVI OF TRUNK: Status: ACTIVE | Noted: 2023-04-13

## 2023-04-13 PROBLEM — T88.7XXA UNSPECIFIED ADVERSE EFFECT OF DRUG OR MEDICAMENT, INITIAL ENCOUNTER: Status: ACTIVE | Noted: 2023-04-13

## 2023-04-13 PROCEDURE — ? COUNSELING

## 2023-04-13 PROCEDURE — ? FULL BODY SKIN EXAM

## 2023-04-13 PROCEDURE — ? OTC TREATMENT REGIMEN

## 2023-04-13 PROCEDURE — ? PRESCRIPTION

## 2023-04-13 PROCEDURE — ? TREATMENT REGIMEN

## 2023-04-13 PROCEDURE — 99203 OFFICE O/P NEW LOW 30 MIN: CPT

## 2023-04-13 RX ORDER — BETAMETHASONE DIPROPIONATE 0.5 MG/G
1 CREAM TOPICAL BID
Qty: 90 | Refills: 2 | Status: ERX | COMMUNITY
Start: 2023-04-13

## 2023-04-13 RX ADMIN — BETAMETHASONE DIPROPIONATE 1: 0.5 CREAM TOPICAL at 00:00

## 2023-04-13 ASSESSMENT — LOCATION DETAILED DESCRIPTION DERM
LOCATION DETAILED: RIGHT PROXIMAL DORSAL FOREARM
LOCATION DETAILED: RIGHT DORSAL FOOT
LOCATION DETAILED: RIGHT SUPERIOR MEDIAL UPPER BACK
LOCATION DETAILED: LEFT PROXIMAL DORSAL FOREARM
LOCATION DETAILED: LEFT PROXIMAL PRETIBIAL REGION
LOCATION DETAILED: RIGHT PROXIMAL PRETIBIAL REGION
LOCATION DETAILED: LEFT SUPERIOR MEDIAL UPPER BACK
LOCATION DETAILED: UPPER STERNUM
LOCATION DETAILED: INFERIOR THORACIC SPINE
LOCATION DETAILED: LEFT DORSAL FOOT

## 2023-04-13 ASSESSMENT — LOCATION ZONE DERM
LOCATION ZONE: ARM
LOCATION ZONE: TRUNK
LOCATION ZONE: FEET
LOCATION ZONE: LEG

## 2023-04-13 ASSESSMENT — LOCATION SIMPLE DESCRIPTION DERM
LOCATION SIMPLE: RIGHT FOREARM
LOCATION SIMPLE: LEFT FOREARM
LOCATION SIMPLE: RIGHT FOOT
LOCATION SIMPLE: RIGHT PRETIBIAL REGION
LOCATION SIMPLE: LEFT UPPER BACK
LOCATION SIMPLE: UPPER BACK
LOCATION SIMPLE: LEFT PRETIBIAL REGION
LOCATION SIMPLE: CHEST
LOCATION SIMPLE: RIGHT UPPER BACK
LOCATION SIMPLE: LEFT FOOT

## 2023-04-13 NOTE — HPI: EVALUATION OF SKIN LESION(S)
Hpi Title: Evaluation of Skin Lesions
Additional History: \\n\\nPatient has rash on arms and legs and he believes it’s an allergic reaction from a new drug he was prescribed called eplerenone.\\nPossible rash on feet unrelated to the possible allergic reaction.\\nCheck possible spot on left sideburn area.

## 2023-04-14 ENCOUNTER — LAB OUTSIDE AN ENCOUNTER (OUTPATIENT)
Dept: URBAN - METROPOLITAN AREA CLINIC 7 | Facility: CLINIC | Age: 63
End: 2023-04-14

## 2023-04-17 ENCOUNTER — LAB OUTSIDE AN ENCOUNTER (OUTPATIENT)
Dept: URBAN - METROPOLITAN AREA CLINIC 7 | Facility: CLINIC | Age: 63
End: 2023-04-17

## 2023-04-19 ENCOUNTER — WEB ENCOUNTER (OUTPATIENT)
Dept: URBAN - METROPOLITAN AREA CLINIC 7 | Facility: CLINIC | Age: 63
End: 2023-04-19

## 2023-04-20 ENCOUNTER — LAB OUTSIDE AN ENCOUNTER (OUTPATIENT)
Dept: URBAN - METROPOLITAN AREA CLINIC 7 | Facility: CLINIC | Age: 63
End: 2023-04-20

## 2023-04-23 ENCOUNTER — LAB OUTSIDE AN ENCOUNTER (OUTPATIENT)
Dept: URBAN - METROPOLITAN AREA CLINIC 7 | Facility: CLINIC | Age: 63
End: 2023-04-23

## 2023-04-24 ENCOUNTER — LAB OUTSIDE AN ENCOUNTER (OUTPATIENT)
Dept: URBAN - METROPOLITAN AREA CLINIC 8 | Facility: CLINIC | Age: 63
End: 2023-04-24

## 2023-04-26 ENCOUNTER — LAB OUTSIDE AN ENCOUNTER (OUTPATIENT)
Dept: URBAN - METROPOLITAN AREA CLINIC 7 | Facility: CLINIC | Age: 63
End: 2023-04-26

## 2023-04-29 ENCOUNTER — LAB OUTSIDE AN ENCOUNTER (OUTPATIENT)
Dept: URBAN - METROPOLITAN AREA CLINIC 7 | Facility: CLINIC | Age: 63
End: 2023-04-29

## 2023-05-02 ENCOUNTER — LAB OUTSIDE AN ENCOUNTER (OUTPATIENT)
Dept: URBAN - METROPOLITAN AREA CLINIC 7 | Facility: CLINIC | Age: 63
End: 2023-05-02

## 2023-05-05 ENCOUNTER — LAB OUTSIDE AN ENCOUNTER (OUTPATIENT)
Dept: URBAN - METROPOLITAN AREA CLINIC 7 | Facility: CLINIC | Age: 63
End: 2023-05-05

## 2023-05-05 ENCOUNTER — WEB ENCOUNTER (OUTPATIENT)
Dept: URBAN - METROPOLITAN AREA CLINIC 7 | Facility: CLINIC | Age: 63
End: 2023-05-05

## 2023-05-08 ENCOUNTER — LAB OUTSIDE AN ENCOUNTER (OUTPATIENT)
Dept: URBAN - METROPOLITAN AREA CLINIC 7 | Facility: CLINIC | Age: 63
End: 2023-05-08

## 2023-05-10 ENCOUNTER — OFFICE VISIT (OUTPATIENT)
Dept: URBAN - METROPOLITAN AREA CLINIC 7 | Facility: CLINIC | Age: 63
End: 2023-05-10
Payer: OTHER GOVERNMENT

## 2023-05-10 VITALS
BODY MASS INDEX: 36.45 KG/M2 | RESPIRATION RATE: 16 BRPM | WEIGHT: 275 LBS | DIASTOLIC BLOOD PRESSURE: 70 MMHG | TEMPERATURE: 97.6 F | HEIGHT: 73 IN | SYSTOLIC BLOOD PRESSURE: 120 MMHG

## 2023-05-10 DIAGNOSIS — Z86.010 HISTORY OF COLON POLYPS: ICD-10-CM

## 2023-05-10 DIAGNOSIS — Z95.828 S/P TIPS (TRANSJUGULAR INTRAHEPATIC PORTOSYSTEMIC SHUNT): ICD-10-CM

## 2023-05-10 DIAGNOSIS — E87.70 FLUID OVERLOAD: ICD-10-CM

## 2023-05-10 DIAGNOSIS — I27.20 PULMONARY HYPERTENSION: ICD-10-CM

## 2023-05-10 DIAGNOSIS — I50.9 CONGESTIVE HEART FAILURE: ICD-10-CM

## 2023-05-10 DIAGNOSIS — E11.9 DIABETES MELLITUS: ICD-10-CM

## 2023-05-10 DIAGNOSIS — D69.6 THROMBOCYTOPENIA: ICD-10-CM

## 2023-05-10 DIAGNOSIS — R06.00 DYSPNEA: ICD-10-CM

## 2023-05-10 DIAGNOSIS — R18.8 ASCITES: ICD-10-CM

## 2023-05-10 DIAGNOSIS — K70.31 ALCOHOLIC CIRRHOSIS OF LIVER WITH ASCITES: ICD-10-CM

## 2023-05-10 PROCEDURE — 99215 OFFICE O/P EST HI 40 MIN: CPT | Performed by: INTERNAL MEDICINE

## 2023-05-10 RX ORDER — CALCIUM CARBONATE 300MG(750)
AS DIRECTED TABLET,CHEWABLE ORAL
Status: ACTIVE | COMMUNITY

## 2023-05-10 RX ORDER — NADOLOL 20 MG/1
1 TABLETS TABLET ORAL ONCE A DAY
Status: ACTIVE | COMMUNITY

## 2023-05-10 RX ORDER — ASPIRIN 81 MG/1
1 TABLET TABLET, COATED ORAL ONCE A DAY
Status: ACTIVE | COMMUNITY

## 2023-05-10 RX ORDER — POTASSIUM CHLORIDE 1.5 G/1.58G
1 PACKET WITH FOOD POWDER, FOR SOLUTION ORAL ONCE A DAY
Status: ACTIVE | COMMUNITY

## 2023-05-10 RX ORDER — EPLERENONE 25 MG/1
1 TABLET TABLET ORAL ONCE A DAY
Status: ACTIVE | COMMUNITY

## 2023-05-10 RX ORDER — TORSEMIDE 20 MG/1
2 TABLET ORAL ONCE A DAY
Status: ACTIVE | COMMUNITY

## 2023-05-10 RX ORDER — PANTOPRAZOLE SODIUM 40 MG/1
1 TABLET TABLET, DELAYED RELEASE ORAL TWICE A DAY
Status: ACTIVE | COMMUNITY

## 2023-05-10 RX ORDER — DILTIAZEM HYDROCHLORIDE 120 MG/1
1 CAPSULE CAPSULE, EXTENDED RELEASE ORAL ONCE A DAY
Status: DISCONTINUED | COMMUNITY

## 2023-05-10 RX ORDER — EMPAGLIFLOZIN 10 MG/1
1 TABLET TABLET, FILM COATED ORAL ONCE A DAY
Status: ACTIVE | COMMUNITY

## 2023-05-10 RX ORDER — FERROUS SULFATE 325(65) MG
1 TABLET TABLET ORAL ONCE A DAY
Status: ACTIVE | COMMUNITY

## 2023-05-10 RX ORDER — LORATADINE 10 MG
1 TABLET TABLET ORAL ONCE A DAY
Status: ACTIVE | COMMUNITY

## 2023-05-11 ENCOUNTER — LAB OUTSIDE AN ENCOUNTER (OUTPATIENT)
Dept: URBAN - METROPOLITAN AREA CLINIC 7 | Facility: CLINIC | Age: 63
End: 2023-05-11

## 2023-05-14 ENCOUNTER — LAB OUTSIDE AN ENCOUNTER (OUTPATIENT)
Dept: URBAN - METROPOLITAN AREA CLINIC 7 | Facility: CLINIC | Age: 63
End: 2023-05-14

## 2023-05-17 ENCOUNTER — LAB OUTSIDE AN ENCOUNTER (OUTPATIENT)
Dept: URBAN - METROPOLITAN AREA CLINIC 7 | Facility: CLINIC | Age: 63
End: 2023-05-17

## 2023-05-20 ENCOUNTER — LAB OUTSIDE AN ENCOUNTER (OUTPATIENT)
Dept: URBAN - METROPOLITAN AREA CLINIC 7 | Facility: CLINIC | Age: 63
End: 2023-05-20

## 2023-05-22 ENCOUNTER — LAB OUTSIDE AN ENCOUNTER (OUTPATIENT)
Dept: URBAN - METROPOLITAN AREA CLINIC 8 | Facility: CLINIC | Age: 63
End: 2023-05-22

## 2023-05-23 ENCOUNTER — LAB OUTSIDE AN ENCOUNTER (OUTPATIENT)
Dept: URBAN - METROPOLITAN AREA CLINIC 7 | Facility: CLINIC | Age: 63
End: 2023-05-23

## 2023-05-26 ENCOUNTER — LAB OUTSIDE AN ENCOUNTER (OUTPATIENT)
Dept: URBAN - METROPOLITAN AREA CLINIC 7 | Facility: CLINIC | Age: 63
End: 2023-05-26

## 2023-05-29 ENCOUNTER — LAB OUTSIDE AN ENCOUNTER (OUTPATIENT)
Dept: URBAN - METROPOLITAN AREA CLINIC 7 | Facility: CLINIC | Age: 63
End: 2023-05-29

## 2023-06-01 ENCOUNTER — LAB OUTSIDE AN ENCOUNTER (OUTPATIENT)
Dept: URBAN - METROPOLITAN AREA CLINIC 7 | Facility: CLINIC | Age: 63
End: 2023-06-01

## 2023-06-04 ENCOUNTER — LAB OUTSIDE AN ENCOUNTER (OUTPATIENT)
Dept: URBAN - METROPOLITAN AREA CLINIC 7 | Facility: CLINIC | Age: 63
End: 2023-06-04

## 2023-06-07 ENCOUNTER — LAB OUTSIDE AN ENCOUNTER (OUTPATIENT)
Dept: URBAN - METROPOLITAN AREA CLINIC 7 | Facility: CLINIC | Age: 63
End: 2023-06-07

## 2023-06-10 ENCOUNTER — LAB OUTSIDE AN ENCOUNTER (OUTPATIENT)
Dept: URBAN - METROPOLITAN AREA CLINIC 7 | Facility: CLINIC | Age: 63
End: 2023-06-10

## 2023-06-13 ENCOUNTER — LAB OUTSIDE AN ENCOUNTER (OUTPATIENT)
Dept: URBAN - METROPOLITAN AREA CLINIC 7 | Facility: CLINIC | Age: 63
End: 2023-06-13

## 2023-06-16 ENCOUNTER — LAB OUTSIDE AN ENCOUNTER (OUTPATIENT)
Dept: URBAN - METROPOLITAN AREA CLINIC 7 | Facility: CLINIC | Age: 63
End: 2023-06-16

## 2023-06-19 ENCOUNTER — LAB OUTSIDE AN ENCOUNTER (OUTPATIENT)
Dept: URBAN - METROPOLITAN AREA CLINIC 7 | Facility: CLINIC | Age: 63
End: 2023-06-19

## 2023-06-19 ENCOUNTER — LAB OUTSIDE AN ENCOUNTER (OUTPATIENT)
Dept: URBAN - METROPOLITAN AREA CLINIC 8 | Facility: CLINIC | Age: 63
End: 2023-06-19

## 2023-06-21 ENCOUNTER — OFFICE VISIT (OUTPATIENT)
Dept: URBAN - METROPOLITAN AREA CLINIC 7 | Facility: CLINIC | Age: 63
End: 2023-06-21

## 2023-06-22 ENCOUNTER — LAB OUTSIDE AN ENCOUNTER (OUTPATIENT)
Dept: URBAN - METROPOLITAN AREA CLINIC 7 | Facility: CLINIC | Age: 63
End: 2023-06-22

## 2023-06-25 ENCOUNTER — LAB OUTSIDE AN ENCOUNTER (OUTPATIENT)
Dept: URBAN - METROPOLITAN AREA CLINIC 7 | Facility: CLINIC | Age: 63
End: 2023-06-25

## 2023-06-28 ENCOUNTER — LAB OUTSIDE AN ENCOUNTER (OUTPATIENT)
Dept: URBAN - METROPOLITAN AREA CLINIC 7 | Facility: CLINIC | Age: 63
End: 2023-06-28

## 2023-07-01 ENCOUNTER — TELEPHONE ENCOUNTER (OUTPATIENT)
Dept: URBAN - METROPOLITAN AREA CLINIC 7 | Facility: CLINIC | Age: 63
End: 2023-07-01

## 2023-07-01 ENCOUNTER — LAB OUTSIDE AN ENCOUNTER (OUTPATIENT)
Dept: URBAN - METROPOLITAN AREA CLINIC 7 | Facility: CLINIC | Age: 63
End: 2023-07-01

## 2023-07-03 ENCOUNTER — LAB OUTSIDE AN ENCOUNTER (OUTPATIENT)
Dept: URBAN - METROPOLITAN AREA CLINIC 7 | Facility: CLINIC | Age: 63
End: 2023-07-03

## 2023-07-05 ENCOUNTER — TELEPHONE ENCOUNTER (OUTPATIENT)
Dept: URBAN - METROPOLITAN AREA CLINIC 7 | Facility: CLINIC | Age: 63
End: 2023-07-05

## 2023-07-07 ENCOUNTER — TELEPHONE ENCOUNTER (OUTPATIENT)
Dept: URBAN - METROPOLITAN AREA CLINIC 7 | Facility: CLINIC | Age: 63
End: 2023-07-07

## 2023-07-13 ENCOUNTER — OFFICE VISIT (OUTPATIENT)
Dept: URBAN - METROPOLITAN AREA CLINIC 7 | Facility: CLINIC | Age: 63
End: 2023-07-13
Payer: OTHER GOVERNMENT

## 2023-07-13 VITALS
SYSTOLIC BLOOD PRESSURE: 130 MMHG | TEMPERATURE: 97.6 F | BODY MASS INDEX: 35.25 KG/M2 | WEIGHT: 266 LBS | HEIGHT: 73 IN | DIASTOLIC BLOOD PRESSURE: 70 MMHG | RESPIRATION RATE: 16 BRPM

## 2023-07-13 DIAGNOSIS — E87.70 FLUID OVERLOAD: ICD-10-CM

## 2023-07-13 DIAGNOSIS — K65.2 SBP (SPONTANEOUS BACTERIAL PERITONITIS): ICD-10-CM

## 2023-07-13 DIAGNOSIS — I50.9 CONGESTIVE HEART FAILURE: ICD-10-CM

## 2023-07-13 DIAGNOSIS — Z86.010 HISTORY OF COLON POLYPS: ICD-10-CM

## 2023-07-13 DIAGNOSIS — I27.20 PULMONARY HYPERTENSION: ICD-10-CM

## 2023-07-13 DIAGNOSIS — K70.31 ALCOHOLIC CIRRHOSIS OF LIVER WITH ASCITES: ICD-10-CM

## 2023-07-13 DIAGNOSIS — R18.8 ASCITES: ICD-10-CM

## 2023-07-13 DIAGNOSIS — D69.6 THROMBOCYTOPENIA: ICD-10-CM

## 2023-07-13 DIAGNOSIS — Z95.828 S/P TIPS (TRANSJUGULAR INTRAHEPATIC PORTOSYSTEMIC SHUNT): ICD-10-CM

## 2023-07-13 DIAGNOSIS — R06.00 DYSPNEA: ICD-10-CM

## 2023-07-13 DIAGNOSIS — E11.9 DIABETES MELLITUS: ICD-10-CM

## 2023-07-13 PROCEDURE — 99214 OFFICE O/P EST MOD 30 MIN: CPT | Performed by: INTERNAL MEDICINE

## 2023-07-13 RX ORDER — ASPIRIN 81 MG/1
1 TABLET TABLET, COATED ORAL ONCE A DAY
Status: ACTIVE | COMMUNITY

## 2023-07-13 RX ORDER — EPLERENONE 25 MG/1
1 TABLET TABLET ORAL ONCE A DAY
Status: ACTIVE | COMMUNITY

## 2023-07-13 RX ORDER — TORSEMIDE 20 MG/1
2 TABLET ORAL ONCE A DAY
Status: ACTIVE | COMMUNITY

## 2023-07-13 RX ORDER — EMPAGLIFLOZIN 10 MG/1
1 TABLET TABLET, FILM COATED ORAL ONCE A DAY
Status: ACTIVE | COMMUNITY

## 2023-07-13 RX ORDER — CALCIUM CARBONATE 300MG(750)
AS DIRECTED TABLET,CHEWABLE ORAL
Status: ACTIVE | COMMUNITY

## 2023-07-13 RX ORDER — LORATADINE 10 MG
1 TABLET TABLET ORAL ONCE A DAY
Status: ACTIVE | COMMUNITY

## 2023-07-13 RX ORDER — PANTOPRAZOLE SODIUM 40 MG/1
1 TABLET TABLET, DELAYED RELEASE ORAL TWICE A DAY
Status: ACTIVE | COMMUNITY

## 2023-07-13 RX ORDER — FERROUS SULFATE 325(65) MG
1 TABLET TABLET ORAL ONCE A DAY
Status: ACTIVE | COMMUNITY

## 2023-07-13 RX ORDER — POTASSIUM CHLORIDE 1.5 G/1.58G
1 PACKET WITH FOOD POWDER, FOR SOLUTION ORAL TWICE A DAY
Status: ACTIVE | COMMUNITY

## 2023-07-13 RX ORDER — NADOLOL 20 MG/1
1 TABLETS TABLET ORAL ONCE A DAY
Status: ACTIVE | COMMUNITY

## 2023-07-13 NOTE — HPI-TODAY'S VISIT:
LV 3/2023. Following him for decompensated EtOH cirrhosis with recurrent large volume ascites treated with diuretics and historically complicated by mild hyponatremia. He finally had TIPS 4/25/22, but unforunately despite this, developed recurrent ascites, issues with pulm edema and then had TIPS reversed in mid 2022. EGD 4/16/21 with variable Z-line (no serra's), no EV, small HH, mild PHG. Colon 8/2020 with 3 TA, normal colon bx, diverticulosis. CT liver in 7/2021 was negative for hepatic masses and no evidence of PVT. In 2020, neg AMA, ASMA, normal iron, negative celiac testing. Has been seen at liver txp center at this point (at [facility]). AFP 3.9. ECHO in 2020 with normal LVEF and RVSP. Prior to TIPS, had been getting weekly paracenteses for months (usually large volume, >6 liters). Urine sodium was low. Labs 11/9/21 with Hgb 13.4, plts 107, Cr 1.0, Na 137. Aside from ascites, he has not had any other signs of decompensation. No GI bleeding, no jaundice. Unfortunately, his TIPS was complicated by volume overload necessitating diuretic use (heart failure symptoms). Recent labs in late Aug 2022 with Hgb 9.7, plts 116 (Hgb was in the 12s earlier this year), INR 1.21, Na 138, Cr 1.85, . AFP 2.5 in July 2022. BNP > 1000 in July. Para in late 8/2022 with low protein, low albumin, low PMN count, cytology neg, culture neg. ECHO 8/2022 with EF 55-60%, low normal RV function, TAVR with good function, progressive MV stenosis. TIPS was downsized in July from 10 mm to 6 mm and there was reduction in atrial pressure (40 to 33 mm Hg) and ultimately TIPS reversed. Was told he needs repair of his mitral valve. I ultimately advised that he be hospitalized given his dyspnea issues. Dobutamine stress echo done 9/21/2022 demonstrated a left ventricular EF of 55 to 60%, normal right ventricular and left ventricular systolic function, progressive moderate mitral valve stenosis with a mean gradient of 9 mmHg. Left ventricular function is estimated to be hyperdynamic with an ejection fraction of over 65% after stress.  Severe mitral valve stenosis post-rest with a mean gradient of 14 mmHg.  Patient did have a right heart cath on September 16, 2022 which demonstrated severe pulmonary hypertension (64/24 with a mean of 43) with positive vasoactive adenosine challenge with a pulmonary pressure that decreased to 52/13 with a mean of 33.  He also had TIPS closure 9/2022. Labs 9/2022 demonstrated a white count of 3.6, hemoglobin 9.5, platelets of 107, INR 1.2, Na 133, creatinine 1.08, potassium 4.1. Bubble study negative in 9/2022. Patient with alcohol cirrhosis, relatively low MELD historically, refractory ascites now s/p TIPS, complicated by fluid overload, necessitating TIPS downsizing in July 2022, and now TIPS occlusion 9/2022 with improvement in shortness of breath symptoms. Cardiac testing shows pulmonary HTN (no shunt on bubble, so likely not HPS), and does have sleep apnea and mitral valve stenosis suggestive of other etiologies not related to bart-pulmonary HTN. Also had a known chronically occluded RCA. Continued with therapeutic paracenteses up to twice weekly, as he gets hyponatremic and renal insufficiency with aggressive diuretics and continues with refractory ascites. Have advised that he once again establish with a transplant center. Did his EGD for variceal surveillance 10/2022 with small EV's, variable Z-line, mild PHG, the beginnings of GAVE, no GV. He was to have a NORMAN for severe mitral stenosis (is seeing cardiology and cardiac surgery). Follow up with cardiac surg on 12/4/22. His case will be reviewed in cardiac board. He is being evaluated for mitral replacement. He does have mild obstructive deficit on PFT's as well. Was being seen by [facility] transplant. Na 135, Cr 1.21, alk phos 179, liver enzymes normal. no INR. Plan was to continue para's as needed for ascites, complete cardiac workup (and may need mitral replacement), MELD labs every 6 weeks, and eval by transplant center ([facility]). He does have gynecomastia from aldactone, so will decrease his dose to 25 mg in the AM, and on 40 mg of torsemide. MRI liver was negative for HCC 10/2022. LV, had seen [facility] for transplant, but has a low MELD score. He had also been evaluated by cardiology locally here, and has been referred to cardiology at [facility] for another workup for mitral valve. He had lost 40 lbs of fluid overall, but still needing para's twice weekly. Torsemide once daily. Overall very stable. MELD 8. Continued paracentesis weekly to twice weekly, and continue MELD labs. Continue MELD labs every 6-8 weeks (recent Na 131, Cr 0.99, Hgb 12.5, plts 127, INR 1.1). Will get cardiac workup at [place] as well in January 2022. FU now after multiple visits with cardiology, pulmonary and there is some thought that his pulmonary HTN is in part secodary to portopulmonary hypertension with consideration of therapy for PAH. Seen by [facility] cards on 3/23/23 and rec was for him to be seen by CT surgery for consideration of mitral valve surgery. Seen at [facility] for transplant eval and with such a low MELD, living donor is his best option for transplant, but no viable candidate for this. On torsemide for diuretics. No clear evidence for autoimmune disorder. Seen by CT surgery 3/13/23 at [facility] and he was not thought to be a candidate for surgery at that time given that his mitral valve function was not bad - there is some discussion between thoracic/cards but no plans for surgical intervention now. He continues to have ascites which is getting tapped twice per week, large volume, which is the frustrating part for him. Still on torsemide 20 mg daily (was on 40 mg before, but not anymore). Continued with abdominal ascites which is his biggest problem, but no real options to treat this unfortunately. Continued with torsemide 20 mg daily, and had follow up with cards, and pulmonary. MELD is very low, and established with [facility] transplant at this point. EGD 10/2023, imaging in 10/2023. Since last visit, was seen by pHTN specialist who does not feel he has pulm arterial HTN due to normal PVR (thus no portopulmonary HTN), and that he has Group 2 pHTN (diastolic disease, valve issue, increased flow) and that his option is increased diuresis and possible re-do TIPS in another PV branch. Also saw [facility] pulmonary and they concurred with advisement for diuretics. He is on jardiance 10, torsemide 40 mg daily. [facility] did suggest possible re-do TIPS. Ascites fluid continues to be the biggest issue. Hgb 13, Cr 1.24, Na 137 - April 4th, 2023. INR 1.18 (MELD-Na 10). Continued with refractory ascites requiring multiple para's per week. Has seen [facility] and [facility] for opinions from a cardiopulmonary perspective. We are relegated to using diuretics at this point, but he has had some intolerance to diuretics in the past, so have to be careful and last Cr 1.24. Eplerenone caused hives so cannot be used. Metalazone may potentially be used. Also will have him discuss TIPS with [facility].  Since last visit was hospitalized and noted to have SBP for which he was treated with antibiotics. Hemoglobin June 30, 2023 was 11.6, platelets 79, sodium 133, creatinine 1.09, alk phos 173, bilirubin 1.6.  Repeat paracentesis with cell count June 30, 2023 demonstrated white cell count of 1328 with 44% neutrophils so much improved from his prior cell count which demonstrated 2800 white cells 89% of which were neutrophils.  He did let me know that he was having some issues with palpitations and was concerned about A-fib.  There was concern about using beta-blockers given his SBP and ascites.  He felt that diltiazem was not helping control his rate as well as beta-blockers and so he placed himself back on nadolol with good response. MELD 15, Child gutierres 7, and fluid albumin < 1.5g. Last para was Tuesday. Just finished antibiotics this week (cipro/flagyl). , 3% PMNs. He is back on Nadolol. Torsemide 20 mg daily, no eplerenone.

## 2023-07-17 ENCOUNTER — LAB OUTSIDE AN ENCOUNTER (OUTPATIENT)
Dept: URBAN - METROPOLITAN AREA CLINIC 8 | Facility: CLINIC | Age: 63
End: 2023-07-17

## 2023-08-07 ENCOUNTER — LAB OUTSIDE AN ENCOUNTER (OUTPATIENT)
Dept: URBAN - METROPOLITAN AREA CLINIC 7 | Facility: CLINIC | Age: 63
End: 2023-08-07

## 2023-08-14 ENCOUNTER — LAB OUTSIDE AN ENCOUNTER (OUTPATIENT)
Dept: URBAN - METROPOLITAN AREA CLINIC 8 | Facility: CLINIC | Age: 63
End: 2023-08-14

## 2023-08-18 ENCOUNTER — WEB ENCOUNTER (OUTPATIENT)
Dept: URBAN - METROPOLITAN AREA CLINIC 7 | Facility: CLINIC | Age: 63
End: 2023-08-18

## 2023-08-21 ENCOUNTER — LAB OUTSIDE AN ENCOUNTER (OUTPATIENT)
Dept: URBAN - METROPOLITAN AREA CLINIC 7 | Facility: CLINIC | Age: 63
End: 2023-08-21

## 2023-09-08 ENCOUNTER — WEB ENCOUNTER (OUTPATIENT)
Dept: URBAN - METROPOLITAN AREA CLINIC 7 | Facility: CLINIC | Age: 63
End: 2023-09-08

## 2023-09-11 ENCOUNTER — LAB OUTSIDE AN ENCOUNTER (OUTPATIENT)
Dept: URBAN - METROPOLITAN AREA CLINIC 8 | Facility: CLINIC | Age: 63
End: 2023-09-11

## 2023-09-18 ENCOUNTER — WEB ENCOUNTER (OUTPATIENT)
Dept: URBAN - METROPOLITAN AREA CLINIC 7 | Facility: CLINIC | Age: 63
End: 2023-09-18

## 2023-09-19 ENCOUNTER — WEB ENCOUNTER (OUTPATIENT)
Dept: URBAN - METROPOLITAN AREA CLINIC 7 | Facility: CLINIC | Age: 63
End: 2023-09-19

## 2023-09-22 ENCOUNTER — TELEPHONE ENCOUNTER (OUTPATIENT)
Dept: URBAN - METROPOLITAN AREA CLINIC 7 | Facility: CLINIC | Age: 63
End: 2023-09-22

## 2023-09-28 ENCOUNTER — WEB ENCOUNTER (OUTPATIENT)
Dept: URBAN - METROPOLITAN AREA CLINIC 7 | Facility: CLINIC | Age: 63
End: 2023-09-28

## 2023-10-09 ENCOUNTER — LAB OUTSIDE AN ENCOUNTER (OUTPATIENT)
Dept: URBAN - METROPOLITAN AREA CLINIC 8 | Facility: CLINIC | Age: 63
End: 2023-10-09

## 2023-11-06 ENCOUNTER — LAB OUTSIDE AN ENCOUNTER (OUTPATIENT)
Dept: URBAN - METROPOLITAN AREA CLINIC 7 | Facility: CLINIC | Age: 63
End: 2023-11-06

## 2023-11-06 ENCOUNTER — LAB OUTSIDE AN ENCOUNTER (OUTPATIENT)
Dept: URBAN - METROPOLITAN AREA CLINIC 8 | Facility: CLINIC | Age: 63
End: 2023-11-06

## 2023-11-13 ENCOUNTER — OFFICE VISIT (OUTPATIENT)
Dept: URBAN - METROPOLITAN AREA CLINIC 7 | Facility: CLINIC | Age: 63
End: 2023-11-13
Payer: OTHER GOVERNMENT

## 2023-11-13 ENCOUNTER — TELEPHONE ENCOUNTER (OUTPATIENT)
Dept: URBAN - METROPOLITAN AREA CLINIC 8 | Facility: CLINIC | Age: 63
End: 2023-11-13

## 2023-11-13 ENCOUNTER — LAB OUTSIDE AN ENCOUNTER (OUTPATIENT)
Dept: URBAN - METROPOLITAN AREA CLINIC 7 | Facility: CLINIC | Age: 63
End: 2023-11-13

## 2023-11-13 VITALS
HEIGHT: 73 IN | SYSTOLIC BLOOD PRESSURE: 130 MMHG | TEMPERATURE: 97.8 F | DIASTOLIC BLOOD PRESSURE: 80 MMHG | RESPIRATION RATE: 16 BRPM | WEIGHT: 254 LBS | BODY MASS INDEX: 33.66 KG/M2

## 2023-11-13 DIAGNOSIS — K70.31 ALCOHOLIC CIRRHOSIS OF LIVER WITH ASCITES: ICD-10-CM

## 2023-11-13 DIAGNOSIS — R06.00 DYSPNEA: ICD-10-CM

## 2023-11-13 DIAGNOSIS — E87.70 FLUID OVERLOAD: ICD-10-CM

## 2023-11-13 DIAGNOSIS — Z86.010 HISTORY OF COLON POLYPS: ICD-10-CM

## 2023-11-13 PROCEDURE — 99214 OFFICE O/P EST MOD 30 MIN: CPT | Performed by: INTERNAL MEDICINE

## 2023-11-13 RX ORDER — PANTOPRAZOLE SODIUM 40 MG/1
1 TABLET TABLET, DELAYED RELEASE ORAL TWICE A DAY
Status: ACTIVE | COMMUNITY

## 2023-11-13 RX ORDER — EPLERENONE 25 MG/1
1 TABLET TABLET ORAL ONCE A DAY
Status: DISCONTINUED | COMMUNITY

## 2023-11-13 RX ORDER — FERROUS SULFATE 325(65) MG
1 TABLET TABLET ORAL ONCE A DAY
Status: ACTIVE | COMMUNITY

## 2023-11-13 RX ORDER — POTASSIUM CHLORIDE 1.5 G/1.58G
1 PACKET WITH FOOD POWDER, FOR SOLUTION ORAL TWICE A DAY
Status: ACTIVE | COMMUNITY

## 2023-11-13 RX ORDER — CALCIUM CARBONATE 300MG(750)
AS DIRECTED TABLET,CHEWABLE ORAL
Status: ACTIVE | COMMUNITY

## 2023-11-13 RX ORDER — EMPAGLIFLOZIN 10 MG/1
1 TABLET TABLET, FILM COATED ORAL ONCE A DAY
Status: DISCONTINUED | COMMUNITY

## 2023-11-13 RX ORDER — NADOLOL 20 MG/1
1 TABLETS TABLET ORAL ONCE A DAY
Status: ACTIVE | COMMUNITY

## 2023-11-13 RX ORDER — ASPIRIN 81 MG/1
1 TABLET TABLET, COATED ORAL ONCE A DAY
Status: ACTIVE | COMMUNITY

## 2023-11-13 RX ORDER — LORATADINE 10 MG
1 TABLET TABLET ORAL ONCE A DAY
Status: ACTIVE | COMMUNITY

## 2023-11-13 RX ORDER — TORSEMIDE 20 MG/1
2 TABLET ORAL ONCE A DAY
Status: ACTIVE | COMMUNITY

## 2023-11-13 NOTE — HPI-TODAY'S VISIT:
LV 7/2023. Following him for decompensated EtOH cirrhosis with recurrent large volume ascites treated with diuretics and historically complicated by mild hyponatremia. He finally had TIPS 4/25/22, but unforunately despite this, developed recurrent ascites, issues with pulm edema and then had TIPS reversed in mid 2022. EGD 4/16/21 with variable Z-line (no serra's), no EV, small HH, mild PHG. Colon 8/2020 with 3 TA, normal colon bx, diverticulosis. CT liver in 7/2021 was negative for hepatic masses and no evidence of PVT. In 2020, neg AMA, ASMA, normal iron, negative celiac testing. Has been seen at liver txp center at this point (at [facility]). AFP 3.9. ECHO in 2020 with normal LVEF and RVSP. Prior to TIPS, had been getting weekly paracenteses for months (usually large volume, >6 liters). Urine sodium was low. Labs 11/9/21 with Hgb 13.4, plts 107, Cr 1.0, Na 137. Aside from ascites, he has not had any other signs of decompensation. No GI bleeding, no jaundice. Unfortunately, his TIPS was complicated by volume overload necessitating diuretic use (heart failure symptoms). Recent labs in late Aug 2022 with Hgb 9.7, plts 116 (Hgb was in the 12s earlier this year), INR 1.21, Na 138, Cr 1.85, . AFP 2.5 in July 2022. BNP > 1000 in July. Para in late 8/2022 with low protein, low albumin, low PMN count, cytology neg, culture neg. ECHO 8/2022 with EF 55-60%, low normal RV function, TAVR with good function, progressive MV stenosis. TIPS was downsized in July from 10 mm to 6 mm and there was reduction in atrial pressure (40 to 33 mm Hg) and ultimately TIPS reversed. Was told he needs repair of his mitral valve. I ultimately advised that he be hospitalized given his dyspnea issues. Dobutamine stress echo done 9/21/2022 demonstrated a left ventricular EF of 55 to 60%, normal right ventricular and left ventricular systolic function, progressive moderate mitral valve stenosis with a mean gradient of 9 mmHg. Left ventricular function is estimated to be hyperdynamic with an ejection fraction of over 65% after stress.  Severe mitral valve stenosis post-rest with a mean gradient of 14 mmHg.  Patient did have a right heart cath on September 16, 2022 which demonstrated severe pulmonary hypertension (64/24 with a mean of 43) with positive vasoactive adenosine challenge with a pulmonary pressure that decreased to 52/13 with a mean of 33.  He also had TIPS closure 9/2022. Labs 9/2022 demonstrated a white count of 3.6, hemoglobin 9.5, platelets of 107, INR 1.2, Na 133, creatinine 1.08, potassium 4.1. Bubble study negative in 9/2022. Patient with alcohol cirrhosis, relatively low MELD historically, refractory ascites now s/p TIPS, complicated by fluid overload, necessitating TIPS downsizing in July 2022, and now TIPS occlusion 9/2022 with improvement in shortness of breath symptoms. Cardiac testing shows pulmonary HTN (no shunt on bubble, so likely not HPS), and does have sleep apnea and mitral valve stenosis suggestive of other etiologies not related to bart-pulmonary HTN. Also had a known chronically occluded RCA. Continued with therapeutic paracenteses up to twice weekly, as he gets hyponatremic and renal insufficiency with aggressive diuretics and continues with refractory ascites. Have advised that he once again establish with a transplant center. Did his EGD for variceal surveillance 10/2022 with small EV's, variable Z-line, mild PHG, the beginnings of GAVE, no GV. He was to have a NORMAN for severe mitral stenosis (is seeing cardiology and cardiac surgery). Follow up with cardiac surg on 12/4/22. His case will be reviewed in cardiac board. He is being evaluated for mitral replacement. He does have mild obstructive deficit on PFT's as well. Was being seen by [facility] transplant. Na 135, Cr 1.21, alk phos 179, liver enzymes normal. no INR. Plan was to continue para's as needed for ascites, complete cardiac workup (and may need mitral replacement), MELD labs every 6 weeks, and eval by transplant center ([facility]). He does have gynecomastia from aldactone, so will decrease his dose to 25 mg in the AM, and on 40 mg of torsemide. MRI liver was negative for HCC 10/2022. LV, had seen [facility] for transplant, but has a low MELD score. He had also been evaluated by cardiology locally here, and has been referred to cardiology at [facility] for another workup for mitral valve. He had lost 40 lbs of fluid overall, but still needing para's twice weekly. Torsemide once daily. Overall very stable. MELD 8. Continued paracentesis weekly to twice weekly, and continue MELD labs. Continue MELD labs every 6-8 weeks (recent Na 131, Cr 0.99, Hgb 12.5, plts 127, INR 1.1). Will get cardiac workup at [facility] as well in January 2022. FU now after multiple visits with cardiology, pulmonary and there is some thought that his pulmonary HTN is in part secodary to portopulmonary hypertension with consideration of therapy for PAH. Seen by [facility] cards on 3/23/23 and rec was for him to be seen by CT surgery for consideration of mitral valve surgery. Seen at [facility] for transplant eval and with such a low MELD, living donor is his best option for transplant, but no viable candidate for this. On torsemide for diuretics. No clear evidence for autoimmune disorder. Seen by CT surgery 3/13/23 at [facility] and he was not thought to be a candidate for surgery at that time given that his mitral valve function was not bad - there is some discussion between thoracic/cards but no plans for surgical intervention now. He continues to have ascites which is getting tapped twice per week, large volume, which is the frustrating part for him. Still on torsemide 20 mg daily (was on 40 mg before, but not anymore). Continued with abdominal ascites which is his biggest problem, but no real options to treat this unfortunately. Continued with torsemide 20 mg daily, and had follow up with cards, and pulmonary. MELD is very low, and established with [facility] transplant at this point. EGD 10/2023, imaging in 10/2023. Since last visit, was seen by pHTN specialist who does not feel he has pulm arterial HTN due to normal PVR (thus no portopulmonary HTN), and that he has Group 2 pHTN (diastolic disease, valve issue, increased flow) and that his option is increased diuresis and possible re-do TIPS in another PV branch. Also saw [facility] pulmonary and they concurred with advisement for diuretics. He is on jardiance 10, torsemide 40 mg daily. [facility] did suggest possible re-do TIPS. Ascites fluid continues to be the biggest issue. Hgb 13, Cr 1.24, Na 137 - April 4th, 2023. INR 1.18 (MELD-Na 10). Continued with refractory ascites requiring multiple para's per week. Has seen [facility] and [facility] for opinions from a cardiopulmonary perspective. We are relegated to using diuretics at this point, but he has had some intolerance to diuretics in the past, so have to be careful and last Cr 1.24. Eplerenone caused hives so cannot be used. Metalazone may potentially be used. Also will have him discuss TIPS with [facility]. He was hospitalized and noted to have SBP for which he was treated with antibiotics in mid-2023. Hemoglobin June 30, 2023 was 11.6, platelets 79, sodium 133, creatinine 1.09, alk phos 173, bilirubin 1.6.  Repeat paracentesis with cell count June 30, 2023 demonstrated white cell count of 1328 with 44% neutrophils so much improved from his prior cell count which demonstrated 2800 white cells 89% of which were neutrophils.  He did let me know that he was having some issues with palpitations and was concerned about A-fib.  There was concern about using beta-blockers given his SBP and ascites.  He felt that diltiazem was not helping control his rate as well as beta-blockers and so he placed himself back on nadolol with good response. MELD 15, Child gutierres 7, and fluid albumin < 1.5g. Last para was Tuesday. Just finished antibiotics this week (cipro/flagyl). , 3% PMNs. He is back on Nadolol. Torsemide 20 mg daily, no eplerenone. Last visit, we discussed that beta-blocker use in his case is high risk because he was symptomatic from a tachycardia perspective without nadolol, the beta-blocker was continued under careful observation.  Also discussed prophylactic antibiotics for SBP but he did not really meet criteria for this yet so chose to observe.  Plan was to continue lab monitoring diuretics beta-blockers and therapeutic paracenteses.  He has had ongoing paracenteses nearly on a weekly basis since I last saw him.  These are usually higher volume paracentesis ranging anywhere from 5 to 8 L.  Labs October 25, 2023 demonstrated a hemoglobin of 12, platelets 78, creatinine 1.6, sodium 137, alk phos 166, AST 29, ALT 24, bilirubin 0.8, A1c 6.4% INR from September 2023 was 1.25.  He did have a period where his creatinine came up to 2.02 and his sodium was 130 on October 17 but this corrected itself a week later.  His uric acid was also 12.3. MELD-Na 13 based on labs from late Oct 2023.  He is having large volume paracenteses, 4 liters last time. MELD 13 last labs. Not that hungry, has lost some weight. Had cdiff in Sept 2023, treated vancomycin. Takes torsemide 10 mg BID, on nadolol as well.

## 2023-11-20 ENCOUNTER — LAB OUTSIDE AN ENCOUNTER (OUTPATIENT)
Dept: URBAN - METROPOLITAN AREA CLINIC 7 | Facility: CLINIC | Age: 63
End: 2023-11-20

## 2023-11-20 ENCOUNTER — TELEPHONE ENCOUNTER (OUTPATIENT)
Dept: URBAN - METROPOLITAN AREA CLINIC 7 | Facility: CLINIC | Age: 63
End: 2023-11-20

## 2023-11-22 ENCOUNTER — WEB ENCOUNTER (OUTPATIENT)
Dept: URBAN - METROPOLITAN AREA CLINIC 7 | Facility: CLINIC | Age: 63
End: 2023-11-22

## 2023-12-04 ENCOUNTER — LAB OUTSIDE AN ENCOUNTER (OUTPATIENT)
Dept: URBAN - METROPOLITAN AREA CLINIC 8 | Facility: CLINIC | Age: 63
End: 2023-12-04

## 2023-12-05 ENCOUNTER — TELEPHONE ENCOUNTER (OUTPATIENT)
Dept: URBAN - METROPOLITAN AREA CLINIC 7 | Facility: CLINIC | Age: 63
End: 2023-12-05

## 2023-12-06 ENCOUNTER — TELEPHONE ENCOUNTER (OUTPATIENT)
Dept: URBAN - METROPOLITAN AREA CLINIC 7 | Facility: CLINIC | Age: 63
End: 2023-12-06

## 2023-12-14 ENCOUNTER — TELEPHONE ENCOUNTER (OUTPATIENT)
Dept: URBAN - METROPOLITAN AREA CLINIC 7 | Facility: CLINIC | Age: 63
End: 2023-12-14

## 2023-12-21 ENCOUNTER — LAB OUTSIDE AN ENCOUNTER (OUTPATIENT)
Dept: URBAN - METROPOLITAN AREA CLINIC 7 | Facility: CLINIC | Age: 63
End: 2023-12-21

## 2024-01-01 ENCOUNTER — LAB OUTSIDE AN ENCOUNTER (OUTPATIENT)
Dept: URBAN - METROPOLITAN AREA CLINIC 8 | Facility: CLINIC | Age: 64
End: 2024-01-01

## 2024-01-11 ENCOUNTER — CLAIMS CREATED FROM THE CLAIM WINDOW (OUTPATIENT)
Dept: URBAN - METROPOLITAN AREA CLINIC 4 | Facility: CLINIC | Age: 64
End: 2024-01-11
Payer: OTHER GOVERNMENT

## 2024-01-11 ENCOUNTER — CLAIMS CREATED FROM THE CLAIM WINDOW (OUTPATIENT)
Dept: URBAN - METROPOLITAN AREA SURGERY CENTER 5 | Facility: SURGERY CENTER | Age: 64
End: 2024-01-11
Payer: OTHER GOVERNMENT

## 2024-01-11 DIAGNOSIS — K63.5 POLYP OF ASCENDING COLON, UNSPECIFIED TYPE: ICD-10-CM

## 2024-01-11 DIAGNOSIS — K57.30 DIVERTICULOSIS OF LARGE INTESTINE WITHOUT PERFORATION OR ABSCESS WITHOUT BLEEDING: ICD-10-CM

## 2024-01-11 DIAGNOSIS — Z86.010 PERSONAL HISTORY OF COLONIC POLYPS: ICD-10-CM

## 2024-01-11 DIAGNOSIS — K64.9 RECTAL VARICES: ICD-10-CM

## 2024-01-11 DIAGNOSIS — K64.8 OTHER HEMORRHOIDS: ICD-10-CM

## 2024-01-11 DIAGNOSIS — K62.89 OTHER SPECIFIED DISEASES OF ANUS AND RECTUM: ICD-10-CM

## 2024-01-11 DIAGNOSIS — K62.1 RECTAL POLYP: ICD-10-CM

## 2024-01-11 DIAGNOSIS — Z86.010 HISTORY OF ADENOMATOUS POLYP OF COLON: ICD-10-CM

## 2024-01-11 DIAGNOSIS — Z09 ENCOUNTER FOR COLONOSCOPY FOLLOWING COLON POLYP REMOVAL: ICD-10-CM

## 2024-01-11 DIAGNOSIS — K63.89 OTHER SPECIFIED DISEASES OF INTESTINE: ICD-10-CM

## 2024-01-11 DIAGNOSIS — K57.30 DIVERTICULOSIS OF COLON: ICD-10-CM

## 2024-01-11 DIAGNOSIS — D12.2 POLYP OF ASCENDING COLON: ICD-10-CM

## 2024-01-11 PROCEDURE — 45385 COLONOSCOPY W/LESION REMOVAL: CPT | Performed by: INTERNAL MEDICINE

## 2024-01-11 PROCEDURE — 00811 ANES LWR INTST NDSC NOS: CPT | Performed by: NURSE ANESTHETIST, CERTIFIED REGISTERED

## 2024-01-11 PROCEDURE — 88305 TISSUE EXAM BY PATHOLOGIST: CPT | Performed by: PATHOLOGY

## 2024-01-11 RX ORDER — POTASSIUM CHLORIDE 1.5 G/1.58G
1 PACKET WITH FOOD POWDER, FOR SOLUTION ORAL TWICE A DAY
Status: ACTIVE | COMMUNITY

## 2024-01-11 RX ORDER — LORATADINE 10 MG
1 TABLET TABLET ORAL ONCE A DAY
Status: ACTIVE | COMMUNITY

## 2024-01-11 RX ORDER — ASPIRIN 81 MG/1
1 TABLET TABLET, COATED ORAL ONCE A DAY
Status: ACTIVE | COMMUNITY

## 2024-01-11 RX ORDER — CALCIUM CARBONATE 300MG(750)
AS DIRECTED TABLET,CHEWABLE ORAL
Status: ACTIVE | COMMUNITY

## 2024-01-11 RX ORDER — PANTOPRAZOLE SODIUM 40 MG/1
1 TABLET TABLET, DELAYED RELEASE ORAL TWICE A DAY
Status: ACTIVE | COMMUNITY

## 2024-01-11 RX ORDER — TORSEMIDE 20 MG/1
2 TABLET ORAL ONCE A DAY
Status: ACTIVE | COMMUNITY

## 2024-01-11 RX ORDER — NADOLOL 20 MG/1
1 TABLETS TABLET ORAL ONCE A DAY
Status: ACTIVE | COMMUNITY

## 2024-01-11 RX ORDER — FERROUS SULFATE 325(65) MG
1 TABLET TABLET ORAL ONCE A DAY
Status: ACTIVE | COMMUNITY

## 2024-01-18 ENCOUNTER — TELEPHONE ENCOUNTER (OUTPATIENT)
Dept: URBAN - METROPOLITAN AREA CLINIC 7 | Facility: CLINIC | Age: 64
End: 2024-01-18

## 2024-01-19 ENCOUNTER — TELEPHONE ENCOUNTER (OUTPATIENT)
Dept: URBAN - METROPOLITAN AREA CLINIC 7 | Facility: CLINIC | Age: 64
End: 2024-01-19

## 2024-01-29 ENCOUNTER — LAB OUTSIDE AN ENCOUNTER (OUTPATIENT)
Dept: URBAN - METROPOLITAN AREA CLINIC 8 | Facility: CLINIC | Age: 64
End: 2024-01-29

## 2024-02-01 ENCOUNTER — LAB (OUTPATIENT)
Dept: URBAN - METROPOLITAN AREA CLINIC 7 | Facility: CLINIC | Age: 64
End: 2024-02-01

## 2024-02-05 ENCOUNTER — LAB (OUTPATIENT)
Dept: URBAN - METROPOLITAN AREA CLINIC 7 | Facility: CLINIC | Age: 64
End: 2024-02-05

## 2024-02-19 ENCOUNTER — LAB (OUTPATIENT)
Dept: URBAN - METROPOLITAN AREA CLINIC 8 | Facility: CLINIC | Age: 64
End: 2024-02-19

## 2024-02-20 ENCOUNTER — LAB (OUTPATIENT)
Dept: URBAN - METROPOLITAN AREA CLINIC 7 | Facility: CLINIC | Age: 64
End: 2024-02-20

## 2024-02-26 ENCOUNTER — LAB (OUTPATIENT)
Dept: URBAN - METROPOLITAN AREA CLINIC 8 | Facility: CLINIC | Age: 64
End: 2024-02-26

## 2024-03-25 PROBLEM — 16838151000119102: Status: ACTIVE | Noted: 2024-03-25

## 2024-03-26 ENCOUNTER — LAB (OUTPATIENT)
Dept: URBAN - METROPOLITAN AREA CLINIC 7 | Facility: CLINIC | Age: 64
End: 2024-03-26

## 2024-03-26 ENCOUNTER — OV EP (OUTPATIENT)
Dept: URBAN - METROPOLITAN AREA CLINIC 7 | Facility: CLINIC | Age: 64
End: 2024-03-26
Payer: OTHER GOVERNMENT

## 2024-03-26 VITALS
SYSTOLIC BLOOD PRESSURE: 120 MMHG | DIASTOLIC BLOOD PRESSURE: 78 MMHG | TEMPERATURE: 97.8 F | BODY MASS INDEX: 31.54 KG/M2 | RESPIRATION RATE: 16 BRPM | WEIGHT: 238 LBS | HEIGHT: 73 IN

## 2024-03-26 DIAGNOSIS — I50.9 CONGESTIVE HEART FAILURE: ICD-10-CM

## 2024-03-26 DIAGNOSIS — D69.6 THROMBOCYTOPENIA: ICD-10-CM

## 2024-03-26 DIAGNOSIS — R18.8 REFRACTORY ASCITES: ICD-10-CM

## 2024-03-26 DIAGNOSIS — I27.20 PULMONARY HYPERTENSION: ICD-10-CM

## 2024-03-26 DIAGNOSIS — K65.2 SBP (SPONTANEOUS BACTERIAL PERITONITIS): ICD-10-CM

## 2024-03-26 DIAGNOSIS — R06.00 DYSPNEA: ICD-10-CM

## 2024-03-26 DIAGNOSIS — Z86.010 HISTORY OF COLON POLYPS: ICD-10-CM

## 2024-03-26 DIAGNOSIS — R18.8 ASCITES: ICD-10-CM

## 2024-03-26 DIAGNOSIS — Z95.828 S/P TIPS (TRANSJUGULAR INTRAHEPATIC PORTOSYSTEMIC SHUNT): ICD-10-CM

## 2024-03-26 DIAGNOSIS — E87.70 FLUID OVERLOAD: ICD-10-CM

## 2024-03-26 DIAGNOSIS — A04.71 RECURRENT CLOSTRIDIOIDES DIFFICILE DIARRHEA: ICD-10-CM

## 2024-03-26 DIAGNOSIS — K70.31 ALCOHOLIC CIRRHOSIS OF LIVER WITH ASCITES: ICD-10-CM

## 2024-03-26 DIAGNOSIS — E11.9 DIABETES MELLITUS: ICD-10-CM

## 2024-03-26 PROBLEM — 13920009: Status: ACTIVE | Noted: 2024-03-26

## 2024-03-26 PROCEDURE — 99215 OFFICE O/P EST HI 40 MIN: CPT | Performed by: INTERNAL MEDICINE

## 2024-03-26 RX ORDER — ASPIRIN 81 MG/1
1 TABLET TABLET, COATED ORAL ONCE A DAY
Status: ACTIVE | COMMUNITY

## 2024-03-26 RX ORDER — PANTOPRAZOLE SODIUM 40 MG/1
1 TABLET TABLET, DELAYED RELEASE ORAL TWICE A DAY
Status: ACTIVE | COMMUNITY

## 2024-03-26 RX ORDER — LACTULOSE 10 G/15ML
15 ML AS DIRECTED SOLUTION ORAL
Qty: 450 MILLILITER | Refills: 3 | Status: ACTIVE | COMMUNITY
Start: 2024-02-08 | End: 2024-06-07

## 2024-03-26 RX ORDER — VANCOMYCIN HYDROCHLORIDE 250 MG/1
2 CAPSULES CAPSULE ORAL
Status: ACTIVE | COMMUNITY

## 2024-03-26 RX ORDER — TORSEMIDE 20 MG/1
2 TABLET ORAL ONCE A DAY
Status: ACTIVE | COMMUNITY

## 2024-03-26 RX ORDER — CALCIUM CARBONATE 300MG(750)
AS DIRECTED TABLET,CHEWABLE ORAL
Status: ACTIVE | COMMUNITY

## 2024-03-26 RX ORDER — POTASSIUM CHLORIDE 1.5 G/1.58G
1 PACKET WITH FOOD POWDER, FOR SOLUTION ORAL TWICE A DAY
Status: ACTIVE | COMMUNITY

## 2024-03-26 RX ORDER — FERROUS SULFATE 325(65) MG
1 TABLET TABLET ORAL ONCE A DAY
Status: ACTIVE | COMMUNITY

## 2024-03-26 RX ORDER — VANCOMYCIN HYDROCHLORIDE 125 MG/1
AS DIRECTED CAPSULE ORAL
Qty: 42 CAPSULE | Refills: 0 | OUTPATIENT
Start: 2024-03-26 | End: 2024-04-30

## 2024-03-26 RX ORDER — NADOLOL 20 MG/1
1 TABLETS TABLET ORAL ONCE A DAY
Status: ACTIVE | COMMUNITY

## 2024-03-26 RX ORDER — LORATADINE 10 MG
1 TABLET TABLET ORAL ONCE A DAY
Status: ACTIVE | COMMUNITY

## 2024-03-26 NOTE — HPI-TODAY'S VISIT:
LV 11/2023. Following him for decompensated EtOH cirrhosis with recurrent large volume ascites refractory to diuretics and dependent on weekly to twice weekly paracenteses. He finally had TIPS 4/25/22, but unfortunately despite this, developed recurrent ascites, issues with pulm edema and then had TIPS reversed in mid 2022. EGD 4/16/21 with variable Z-line (no serra's), no EV, small HH, mild PHG. Colon 8/2020 with 3 TA, normal colon bx, diverticulosis. CT liver in 7/2021 was negative for hepatic masses and no evidence of PVT. In 2020, neg AMA, ASMA, normal iron, negative celiac testing. Seen by [facility] center in 2021. AFP 3.9. ECHO in 2020 with normal LVEF and RVSP. Urine sodium was low. Aside from ascites, he has not had any other signs of decompensation. Unfortunately, his TIPS was complicated by volume overload necessitating diuretic use (heart failure symptoms). ECHO 8/2022 with EF 55-60%, low normal RV function, TAVR with good function, progressive MV stenosis. TIPS was downsized in July from 10 mm to 6 mm and there was reduction in atrial pressure (40 to 33 mm Hg) and ultimately TIPS fully reversed. Was told he needs repair of his mitral valve. I ultimately advised that he be hospitalized given his dyspnea issues. Dobutamine stress echo done 9/21/2022 demonstrated a left ventricular EF of 55 to 60%, normal right ventricular and left ventricular systolic function, progressive moderate mitral valve stenosis with a mean gradient of 9 mmHg. Left ventricular function is estimated to be hyperdynamic with an ejection fraction of over 65% after stress.  Severe mitral valve stenosis post-rest with a mean gradient of 14 mmHg.  Patient did have a right heart cath on September 16, 2022 which demonstrated severe pulmonary hypertension (64/24 with a mean of 43) with positive vasoactive adenosine challenge with a pulmonary pressure that decreased to 52/13 with a mean of 33.  He also had TIPS closure 9/2022. Bubble study negative in 9/2022. Patient with alcohol cirrhosis, relatively low MELD historically, refractory ascites now s/p TIPS, complicated by fluid overload, necessitating TIPS downsizing in July 2022, and now TIPS occlusion 9/2022 with improvement in shortness of breath symptoms. Cardiac testing shows pulmonary HTN (no shunt on bubble, so likely not HPS), and does have sleep apnea and mitral valve stenosis suggestive of other etiologies not related to bart-pulmonary HTN. Also had a known chronically occluded RCA. EGD for variceal surveillance 10/2022 with small EV's, variable Z-line, mild PHG, the beginnings of GAVE, no GV. He was to have a NORMAN for severe mitral stenosis (is seeing cardiology and cardiac surgery). Follow up with cardiac surg on 12/4/22. MRI liver was negative for HCC 10/2022. LV, had seen [facility] for transplant, but has a low MELD score. He had also been evaluated by cardiology locally here, and has been referred to cardiology at [facility] for another workup for mitral valve. He had lost 40 lbs of fluid overall, but still needing para's twice weekly. FU now after multiple visits with cardiology, pulmonary and there is some thought that his pulmonary HTN is in part secodary to portopulmonary hypertension with consideration of therapy for PAH. Seen by [facility] cards on 3/23/23 and rec was for him to be seen by CT surgery for consideration of mitral valve surgery. Seen at [facility] for transplant eval and with such a low MELD, living donor is his best option for transplant, but no viable candidate for this. No clear evidence for autoimmune disorder. Seen by CT surgery 3/13/23 at [facility] and he was not thought to be a candidate for surgery at that time given that his mitral valve function was not bad - there is some discussion between thoracic/cards but no plans for surgical intervention. Continued with abdominal ascites which is his biggest problem, but no real options to treat this unfortunately. MELD is very low, and established with [facility] transplant at this point. Since last visit, was seen by pHTN specialist who does not feel he has pulm arterial HTN due to normal PVR (thus no portopulmonary HTN), and that he has Group 2 pHTN (diastolic disease, valve issue, increased flow) and that his option is increased diuresis and possible re-do TIPS in another PV branch. Also saw [facility] pulmonary and they concurred with advisement for diuretics. He is on jardiance 10, torsemide 40 mg daily. [facility] did suggest possible re-do TIPS. Ascites fluid continues to be the biggest issue.  Continued with refractory ascites requiring multiple para's per week. Has seen [facility] and [facility] for opinions from a cardiopulmonary perspective. We are relegated to using diuretics at this point, but he has had some intolerance to diuretics in the past. Eplerenone caused hives so cannot be used. Metalazone may potentially be used. Also will have him discuss TIPS with [facility]. He was hospitalized and noted to have SBP for which he was treated with antibiotics in mid-2023. Hemoglobin June 30, 2023 was 11.6, platelets 79, sodium 133, creatinine 1.09, alk phos 173, bilirubin 1.6.  Repeat paracentesis with cell count June 30, 2023 demonstrated white cell count of 1328 with 44% neutrophils so much improved from his prior cell count which demonstrated 2800 white cells 89% of which were neutrophils.  He did let me know that he was having some issues with palpitations and was concerned about A-fib.  There was concern about using beta-blockers given his SBP and ascites.  He felt that diltiazem was not helping control his rate as well as beta-blockers and so he placed himself back on nadolol with good response. MELD 15, Child gutierres 7, and fluid albumin < 1.5g. Last para was Tuesday. Just finished antibiotics this week (cipro/flagyl). , 3% PMNs. He was back on Nadolol. Torsemide 20 mg daily, no eplerenone. Last visit, we discussed that beta-blocker use in his case is high risk because he was symptomatic from a tachycardia perspective without nadolol, the beta-blocker was continued under careful observation. Labs October 25, 2023 demonstrated a hemoglobin of 12, platelets 78, creatinine 1.6, sodium 137, alk phos 166, AST 29, ALT 24, bilirubin 0.8, A1c 6.4% INR from September 2023 was 1.25. His uric acid was also 12.3. MELD-Na 13 based on labs from late Oct 2023. He was having large volume paracenteses. MELD 13 last labs. Had cdiff in Sept 2023, treated vancomycin. Takes torsemide 10 mg BID, on nadolol as well. Plan was colonoscopy for screening purposes, cardiac clearance, MRI liver as well for HCC, continue labs and chaka for ascites. Colon 1/2024 with 3 benign polyps, diverticulosis, int hems, impression of small rectal varices. Multiple ER visits since last visit with me. SBP in Feb 2024, COVID in Jan 2024, broke through SBP prophy. Had allergy to Bactrim. Was put back in cipro. Seen by ID. Also multiple episodes of cdiff. Then hospitalized for hyponatremia, diarrhea, cdiff.  Just had paracentesis (10.6 liters, with albumin). Sodium 118 was the lowest it went, diuretics have stopped. Is on vancomycin 500 QID for 8 days per ID. He has had recurrent cdiff. Never had vancomycin. Diarrhea has improved (soft stools, no diarrhea). This got better 2-3 days ago. Na 129, Cr 1.45, bili 1.2, INR 1.23. Hgb 9.8, MCV 90. MELD-Na 21 based on today's exam.

## 2024-04-01 ENCOUNTER — LAB (OUTPATIENT)
Dept: URBAN - METROPOLITAN AREA CLINIC 7 | Facility: CLINIC | Age: 64
End: 2024-04-01

## 2024-04-09 ENCOUNTER — LAB (OUTPATIENT)
Dept: URBAN - METROPOLITAN AREA CLINIC 7 | Facility: CLINIC | Age: 64
End: 2024-04-09

## 2024-04-23 ENCOUNTER — LAB (OUTPATIENT)
Dept: URBAN - METROPOLITAN AREA CLINIC 7 | Facility: CLINIC | Age: 64
End: 2024-04-23

## 2024-05-02 ENCOUNTER — LAB OUTSIDE AN ENCOUNTER (OUTPATIENT)
Dept: URBAN - METROPOLITAN AREA CLINIC 7 | Facility: CLINIC | Age: 64
End: 2024-05-02

## 2024-05-04 ENCOUNTER — LAB OUTSIDE AN ENCOUNTER (OUTPATIENT)
Dept: URBAN - METROPOLITAN AREA CLINIC 7 | Facility: CLINIC | Age: 64
End: 2024-05-04

## 2024-05-06 ENCOUNTER — LAB OUTSIDE AN ENCOUNTER (OUTPATIENT)
Dept: URBAN - METROPOLITAN AREA CLINIC 7 | Facility: CLINIC | Age: 64
End: 2024-05-06

## 2024-05-07 ENCOUNTER — TELEPHONE ENCOUNTER (OUTPATIENT)
Dept: URBAN - METROPOLITAN AREA CLINIC 7 | Facility: CLINIC | Age: 64
End: 2024-05-07

## 2024-05-07 ENCOUNTER — LAB OUTSIDE AN ENCOUNTER (OUTPATIENT)
Dept: URBAN - METROPOLITAN AREA CLINIC 7 | Facility: CLINIC | Age: 64
End: 2024-05-07

## 2024-05-07 PROBLEM — 89627008: Status: ACTIVE | Noted: 2024-05-07

## 2024-05-07 RX ORDER — RIFAXIMIN 550 MG/1
1 TABLET TABLET ORAL TWICE A DAY
Qty: 180 | Refills: 3
Start: 2024-05-02 | End: 2025-05-02

## 2024-05-08 ENCOUNTER — LAB OUTSIDE AN ENCOUNTER (OUTPATIENT)
Dept: URBAN - METROPOLITAN AREA CLINIC 7 | Facility: CLINIC | Age: 64
End: 2024-05-08

## 2024-05-08 ENCOUNTER — OFFICE VISIT (OUTPATIENT)
Dept: URBAN - METROPOLITAN AREA CLINIC 7 | Facility: CLINIC | Age: 64
End: 2024-05-08
Payer: OTHER GOVERNMENT

## 2024-05-08 ENCOUNTER — OFFICE VISIT (OUTPATIENT)
Dept: URBAN - METROPOLITAN AREA CLINIC 7 | Facility: CLINIC | Age: 64
End: 2024-05-08

## 2024-05-08 ENCOUNTER — DASHBOARD ENCOUNTERS (OUTPATIENT)
Age: 64
End: 2024-05-08

## 2024-05-08 VITALS
WEIGHT: 226 LBS | DIASTOLIC BLOOD PRESSURE: 60 MMHG | SYSTOLIC BLOOD PRESSURE: 100 MMHG | HEIGHT: 73 IN | TEMPERATURE: 97.6 F | RESPIRATION RATE: 16 BRPM | BODY MASS INDEX: 29.95 KG/M2

## 2024-05-08 DIAGNOSIS — R18.8 REFRACTORY ASCITES: ICD-10-CM

## 2024-05-08 DIAGNOSIS — E87.70 FLUID OVERLOAD: ICD-10-CM

## 2024-05-08 DIAGNOSIS — E87.1 HYPONATREMIA: ICD-10-CM

## 2024-05-08 DIAGNOSIS — K70.31 ALCOHOLIC CIRRHOSIS OF LIVER WITH ASCITES: ICD-10-CM

## 2024-05-08 PROCEDURE — 99215 OFFICE O/P EST HI 40 MIN: CPT | Performed by: INTERNAL MEDICINE

## 2024-05-08 RX ORDER — ONDANSETRON HYDROCHLORIDE 4 MG/1
1 TABLET TABLET, FILM COATED ORAL ONCE A DAY
Status: ACTIVE | COMMUNITY

## 2024-05-08 RX ORDER — ASPIRIN 81 MG/1
1 TABLET TABLET, COATED ORAL ONCE A DAY
Status: ACTIVE | COMMUNITY

## 2024-05-08 RX ORDER — NADOLOL 20 MG/1
1 TABLETS TABLET ORAL ONCE A DAY
Status: ACTIVE | COMMUNITY

## 2024-05-08 RX ORDER — TORSEMIDE 20 MG/1
2 TABLET ORAL ONCE A DAY
Status: ACTIVE | COMMUNITY

## 2024-05-08 RX ORDER — VANCOMYCIN HYDROCHLORIDE 250 MG/1
2 CAPSULES CAPSULE ORAL
Status: ACTIVE | COMMUNITY

## 2024-05-08 RX ORDER — SODIUM BICARBONATE TAB 650 MG 650 MG
AS DIRECTED TAB ORAL
Status: ACTIVE | COMMUNITY

## 2024-05-08 RX ORDER — CALCIUM CARBONATE 300MG(750)
AS DIRECTED TABLET,CHEWABLE ORAL
Status: ACTIVE | COMMUNITY

## 2024-05-08 RX ORDER — PANTOPRAZOLE SODIUM 40 MG/1
1 TABLET TABLET, DELAYED RELEASE ORAL TWICE A DAY
Status: DISCONTINUED | COMMUNITY

## 2024-05-08 RX ORDER — LACTULOSE 10 G/15ML
15 ML AS DIRECTED SOLUTION ORAL
Qty: 450 MILLILITER | Refills: 3 | Status: ACTIVE | COMMUNITY
Start: 2024-02-08 | End: 2024-06-07

## 2024-05-08 RX ORDER — TORSEMIDE 20 MG/1
2 TABLET ORAL ONCE A DAY
Status: DISCONTINUED | COMMUNITY

## 2024-05-08 RX ORDER — ERGOCALCIFEROL CAPSULES, 1.25 MG/1
1 CAPSULE CAPSULE ORAL
Status: ACTIVE | COMMUNITY

## 2024-05-08 RX ORDER — POTASSIUM CHLORIDE 1.5 G/1.58G
1 PACKET WITH FOOD POWDER, FOR SOLUTION ORAL TWICE A DAY
Status: DISCONTINUED | COMMUNITY

## 2024-05-08 RX ORDER — RIFAXIMIN 550 MG/1
1 TABLET TABLET ORAL TWICE A DAY
Qty: 180 | Refills: 3 | Status: ACTIVE | COMMUNITY
Start: 2024-05-02 | End: 2025-05-02

## 2024-05-08 RX ORDER — LORATADINE 10 MG
1 TABLET TABLET ORAL ONCE A DAY
Status: DISCONTINUED | COMMUNITY

## 2024-05-08 RX ORDER — FERROUS SULFATE 325(65) MG
1 TABLET TABLET ORAL ONCE A DAY
Status: ACTIVE | COMMUNITY

## 2024-05-08 RX ORDER — POTASSIUM CHLORIDE 1.5 G/1.58G
1 PACKET WITH FOOD POWDER, FOR SOLUTION ORAL TWICE A DAY
Status: ACTIVE | COMMUNITY

## 2024-05-08 RX ORDER — MIDODRINE HYDROCHLORIDE 10 MG/1
1 TABLET TABLET ORAL TWICE A DAY
Status: ACTIVE | COMMUNITY

## 2024-05-08 RX ORDER — LORATADINE 10 MG
1 TABLET TABLET ORAL ONCE A DAY
Status: ACTIVE | COMMUNITY

## 2024-05-08 RX ORDER — PANTOPRAZOLE SODIUM 40 MG/1
1 TABLET TABLET, DELAYED RELEASE ORAL TWICE A DAY
Status: ACTIVE | COMMUNITY

## 2024-05-08 RX ORDER — CEFDINIR 300 MG/1
AS DIRECTED CAPSULE ORAL
Status: ACTIVE | COMMUNITY

## 2024-05-09 ENCOUNTER — TELEPHONE ENCOUNTER (OUTPATIENT)
Dept: URBAN - METROPOLITAN AREA CLINIC 7 | Facility: CLINIC | Age: 64
End: 2024-05-09

## 2024-05-10 ENCOUNTER — LAB OUTSIDE AN ENCOUNTER (OUTPATIENT)
Dept: URBAN - METROPOLITAN AREA CLINIC 7 | Facility: CLINIC | Age: 64
End: 2024-05-10

## 2024-05-10 ENCOUNTER — TELEPHONE ENCOUNTER (OUTPATIENT)
Dept: URBAN - METROPOLITAN AREA CLINIC 7 | Facility: CLINIC | Age: 64
End: 2024-05-10

## 2024-05-12 ENCOUNTER — LAB OUTSIDE AN ENCOUNTER (OUTPATIENT)
Dept: URBAN - METROPOLITAN AREA CLINIC 7 | Facility: CLINIC | Age: 64
End: 2024-05-12

## 2024-05-14 ENCOUNTER — LAB OUTSIDE AN ENCOUNTER (OUTPATIENT)
Dept: URBAN - METROPOLITAN AREA CLINIC 7 | Facility: CLINIC | Age: 64
End: 2024-05-14

## 2024-05-21 ENCOUNTER — LAB OUTSIDE AN ENCOUNTER (OUTPATIENT)
Dept: URBAN - METROPOLITAN AREA CLINIC 7 | Facility: CLINIC | Age: 64
End: 2024-05-21

## (undated) LAB
ALBUMIN SERUM: (no result)
ALKALINE PHOSPHATASE: (no result)
ALPHA-FETOPROTEIN-SERUM: (no result)
ALT (SGPT) (ALANINE AMINO TRANSFERASE): (no result)
AST (SGOT)  (ASPART AMINO TRANSFERASE): (no result)
BILIRUBIN, TOTAL: (no result)
BLOOD COUNT, PLATELET, AUTOMATED: (no result)
BUN (BLOOD UREA NITROGEN): (no result)
C DIFF AMPLIFIED PROBE: (no result)
C-REACTIVE PROTEIN: (no result)
CALCIUM SERUM: (no result)
CARBON DIOXIDE (BICARB): (no result)
CARBON DIOXIDE (BICARB): (no result)
CBC COMMENTS: (no result)
CHLORIDE BLOOD: (no result)
CREATININE BLOOD: (no result)
CRYPTOSPORIDUM/GIARDI, INFCT ANTIGEN: (no result)
ESR-F (SED RATE ERYTHROCYTE - FEMALE): (no result)
FECES FAT/LIPIDS QUAL: (no result)
FERRITIN: (no result)
FERRITIN: (no result)
GLUCOSE: (no result)
GRANULOCYTE %: (no result)
HCT (HEMATOCRIT): (no result)
HEPATITIS A ANTBDY-IGG/IGM: (no result)
HEPATITIS B CORE ANTBD-IGG/IGM: (no result)
HEPATITIS B SURFACE ANTIBODY: (no result)
HEPATITIS B SURFACE ANTIGEN: (no result)
HEPATITIS B SURFACE ANTIGEN: (no result)
HEPATITIS BE ANTIBODY: (no result)
HEPATITIS BE ANTIGEN: (no result)
HEPATITIS C ANTIBODY: (no result)
HEPATITIS C ANTIBODY: (no result)
HGB (HEMOGLOBIN): (no result)
IGA/IGD/IGG/IGM-EACH: (no result)
INR: (no result)
IRON BINDING CAPACITY (TIBC): (no result)
IRON BINDING CAPACITY (TIBC): (no result)
IRON: (no result)
IRON: (no result)
LYMPHOCYTES, TOTAL: (no result)
MCH (MEAN CORPUSCULAR HEMOGLOBIN): (no result)
MCHC (MEAN CORPUSCULAR HEMOGLOBIN CONCENTRATI: (no result)
MCV (MEAN CORPUSCULAR VOLUME): (no result)
MONOCYTES: (no result)
POTASSIUM SERUM: (no result)
PROTEIN, TOTAL, SERUM: (no result)
PT (PROTHROMBIN TIME): (no result)
PT RATIO: (no result)
RBC: (no result)
RDW (RED CELL DISTRIBUTION WIDTH): (no result)
SODIUM SERUM: (no result)
T-TRANSGLUTAMINASE 9TTG)IGA: (no result)
WBC: (no result)